# Patient Record
Sex: MALE | Race: WHITE | NOT HISPANIC OR LATINO | Employment: OTHER | URBAN - METROPOLITAN AREA
[De-identification: names, ages, dates, MRNs, and addresses within clinical notes are randomized per-mention and may not be internally consistent; named-entity substitution may affect disease eponyms.]

---

## 2020-10-23 ENCOUNTER — OFFICE VISIT (OUTPATIENT)
Dept: ENDOCRINOLOGY | Facility: CLINIC | Age: 65
End: 2020-10-23

## 2020-10-23 VITALS
WEIGHT: 237.2 LBS | BODY MASS INDEX: 29.49 KG/M2 | HEIGHT: 75 IN | SYSTOLIC BLOOD PRESSURE: 124 MMHG | HEART RATE: 80 BPM | DIASTOLIC BLOOD PRESSURE: 68 MMHG

## 2020-10-23 DIAGNOSIS — E10.42 TYPE 1 DIABETES MELLITUS WITH DIABETIC POLYNEUROPATHY (HCC): ICD-10-CM

## 2020-10-23 DIAGNOSIS — E11.65 UNCONTROLLED TYPE 2 DIABETES MELLITUS WITH HYPERGLYCEMIA (HCC): Primary | ICD-10-CM

## 2020-10-23 PROBLEM — E78.2 MIXED HYPERLIPIDEMIA: Status: ACTIVE | Noted: 2020-10-23

## 2020-10-23 LAB
EXPIRATION DATE: NORMAL
HBA1C MFR BLD: 7.4 %
Lab: NORMAL

## 2020-10-23 PROCEDURE — 99213 OFFICE O/P EST LOW 20 MIN: CPT | Performed by: INTERNAL MEDICINE

## 2020-10-23 PROCEDURE — 83036 HEMOGLOBIN GLYCOSYLATED A1C: CPT | Performed by: INTERNAL MEDICINE

## 2020-10-23 RX ORDER — EMPAGLIFLOZIN 25 MG/1
25 TABLET, FILM COATED ORAL DAILY
Qty: 30 TABLET | Refills: 5 | Status: SHIPPED | OUTPATIENT
Start: 2020-10-23 | End: 2020-12-15 | Stop reason: SDUPTHER

## 2020-10-23 RX ORDER — EMPAGLIFLOZIN 25 MG/1
TABLET, FILM COATED ORAL DAILY
COMMUNITY
End: 2020-10-23 | Stop reason: SDUPTHER

## 2020-10-23 RX ORDER — GLIPIZIDE 10 MG/1
10 TABLET ORAL
Qty: 60 TABLET | Refills: 5 | Status: SHIPPED | OUTPATIENT
Start: 2020-10-23 | End: 2021-02-05 | Stop reason: SDUPTHER

## 2020-10-23 RX ORDER — ASPIRIN 81 MG/1
81 TABLET ORAL DAILY
COMMUNITY

## 2020-10-23 RX ORDER — SEMAGLUTIDE 1.34 MG/ML
INJECTION, SOLUTION SUBCUTANEOUS
COMMUNITY
End: 2020-10-23 | Stop reason: SDUPTHER

## 2020-10-23 RX ORDER — DULOXETIN HYDROCHLORIDE 60 MG/1
60 CAPSULE, DELAYED RELEASE ORAL DAILY
Qty: 30 CAPSULE | Refills: 5 | Status: SHIPPED | OUTPATIENT
Start: 2020-10-23 | End: 2021-02-05 | Stop reason: SDUPTHER

## 2020-10-23 RX ORDER — GEMFIBROZIL 600 MG/1
TABLET, FILM COATED ORAL 3 TIMES DAILY
COMMUNITY
End: 2020-10-23 | Stop reason: SDUPTHER

## 2020-10-23 RX ORDER — COLESEVELAM HYDROCHLORIDE 3.75 G/1
3 POWDER, FOR SUSPENSION ORAL 2 TIMES DAILY
COMMUNITY
End: 2021-02-05

## 2020-10-23 RX ORDER — GEMFIBROZIL 600 MG/1
600 TABLET, FILM COATED ORAL 2 TIMES DAILY
Qty: 60 TABLET | Refills: 5 | Status: SHIPPED | OUTPATIENT
Start: 2020-10-23 | End: 2021-02-05 | Stop reason: SDUPTHER

## 2020-10-23 RX ORDER — GLIPIZIDE 10 MG/1
TABLET ORAL 2 TIMES DAILY
COMMUNITY
End: 2020-10-23 | Stop reason: SDUPTHER

## 2020-10-23 RX ORDER — SEMAGLUTIDE 1.34 MG/ML
1 INJECTION, SOLUTION SUBCUTANEOUS WEEKLY
Qty: 2 PEN | Refills: 5 | Status: SHIPPED | OUTPATIENT
Start: 2020-10-23 | End: 2021-02-05 | Stop reason: SDUPTHER

## 2020-10-23 NOTE — PROGRESS NOTES
"     Office Note      Date: 10/23/2020  Patient Name: Alverto Syed  MRN: [id number]  : 1955    Chief Complaint   Patient presents with   • Diabetes       History of Present Illness:   Alverto Syed is a 64 y.o. male who presents for Diabetes type 2. Diagnosed in: . Treated in past with oral agents. Current treatments: ozempic, metformin, jardiance and glipizide. Number of insulin shots per day: none. Checks blood sugar 2 times a day. Has low blood sugar: no. Aspirin use: Yes. Statin use: No - intolerant of statins. ACE-I/ARB use: No - no indication.. Changes in health since last visit: colon polypectomy. Last eye exam 2019.    He has noted some pain and tingling in his feet that is bothersome.    Subjective      Diabetic Complications:  Eyes: No  Kidneys: No  Feet: Yes - tingling and numbness.  Heart: No    Diet and Exercise:  Meals per day: 3  Minutes of exercise per week: 0 mins.    Review of Systems:   Review of Systems   Constitutional: Negative.    Cardiovascular: Negative.    Gastrointestinal: Negative.    Endocrine: Negative.        The following portions of the patient's history were reviewed and updated as appropriate: allergies, current medications, past family history, past medical history, past social history, past surgical history and problem list.    Objective       Visit Vitals  /68 (BP Location: Left arm, Patient Position: Sitting, Cuff Size: Adult)   Pulse 80   Ht 190.5 cm (75\")   Wt 108 kg (237 lb 3.2 oz)   BMI 29.65 kg/m²       Physical Exam:  Physical Exam  Constitutional:       Appearance: Normal appearance.   Neurological:      Mental Status: He is alert.         Labs:    HbA1c  Lab Results   Component Value Date    HGBA1C 7.4 10/23/2020       CMP  No results found for: GLUCOSE, BUN, CREATININE, EGFRIFNONA, EGFRIFAFRI, BCR, K, CO2, CALCIUM, PROTENTOTREF, LABIL2, BILIRUBIN, AST, ALT     Lipid Panel        TSH  No results found for: TSH, FREET4     Hemoglobin A1C  Lab Results "   Component Value Date    HGBA1C 7.4 10/23/2020        Microalbumin/Creatinine  No results found for: MALBCRERATIO, CREATINIURIN, MICROALBUR        Assessment / Plan      Assessment & Plan:  Problem List Items Addressed This Visit        Endocrine    Uncontrolled type 2 diabetes mellitus with hyperglycemia (CMS/HCC) - Primary    Current Assessment & Plan     Diabetes is improving with treatment.   Continue current treatment regimen.  Diabetes will be reassessed in 3 months.    A1c better. Work on diet/exercise/weight loss.         Relevant Medications    Semaglutide, 1 MG/DOSE, (Ozempic, 1 MG/DOSE,) 2 MG/1.5ML solution pen-injector    metFORMIN (GLUCOPHAGE) 1000 MG tablet    glipizide (GLUCOTROL) 10 MG tablet    Empagliflozin (Jardiance) 25 MG tablet    Other Relevant Orders    POC Glycosylated Hemoglobin (Hb A1C) (Completed)    Type 1 diabetes mellitus with diabetic polyneuropathy (CMS/HCC)    Current Assessment & Plan     We discussed treatment options - trial of duloxetine.         Relevant Medications    Semaglutide, 1 MG/DOSE, (Ozempic, 1 MG/DOSE,) 2 MG/1.5ML solution pen-injector    metFORMIN (GLUCOPHAGE) 1000 MG tablet    glipizide (GLUCOTROL) 10 MG tablet    Empagliflozin (Jardiance) 25 MG tablet           Return in about 3 months (around 1/23/2021) for Recheck with A1c.    Yunior Hidalgo MD   10/23/2020

## 2020-10-23 NOTE — ASSESSMENT & PLAN NOTE
Diabetes is improving with treatment.   Continue current treatment regimen.  Diabetes will be reassessed in 3 months.    A1c better. Work on diet/exercise/weight loss.

## 2020-12-15 RX ORDER — EMPAGLIFLOZIN 25 MG/1
25 TABLET, FILM COATED ORAL DAILY
Qty: 30 TABLET | Refills: 5 | Status: SHIPPED | OUTPATIENT
Start: 2020-12-15 | End: 2021-02-05 | Stop reason: SDUPTHER

## 2020-12-29 RX ORDER — BLOOD SUGAR DIAGNOSTIC
STRIP MISCELLANEOUS
Qty: 200 EACH | Refills: 5 | Status: SHIPPED | OUTPATIENT
Start: 2020-12-29 | End: 2021-02-05 | Stop reason: SDUPTHER

## 2020-12-29 NOTE — TELEPHONE ENCOUNTER
Patient's wife called, asked for a refill order of his test strips. Please send them to [facility] in [place], [place]. Thank you.

## 2021-02-05 ENCOUNTER — OFFICE VISIT (OUTPATIENT)
Dept: ENDOCRINOLOGY | Facility: CLINIC | Age: 66
End: 2021-02-05

## 2021-02-05 VITALS
WEIGHT: 232 LBS | TEMPERATURE: 97.1 F | SYSTOLIC BLOOD PRESSURE: 126 MMHG | OXYGEN SATURATION: 98 % | HEART RATE: 84 BPM | DIASTOLIC BLOOD PRESSURE: 64 MMHG | BODY MASS INDEX: 28.85 KG/M2 | HEIGHT: 75 IN

## 2021-02-05 DIAGNOSIS — E78.2 MIXED HYPERLIPIDEMIA: ICD-10-CM

## 2021-02-05 DIAGNOSIS — E10.42 TYPE 1 DIABETES MELLITUS WITH DIABETIC POLYNEUROPATHY (HCC): ICD-10-CM

## 2021-02-05 DIAGNOSIS — E11.65 UNCONTROLLED TYPE 2 DIABETES MELLITUS WITH HYPERGLYCEMIA (HCC): Primary | ICD-10-CM

## 2021-02-05 LAB
EXPIRATION DATE: NORMAL
HBA1C MFR BLD: 7.4 %
Lab: NORMAL

## 2021-02-05 PROCEDURE — 83036 HEMOGLOBIN GLYCOSYLATED A1C: CPT | Performed by: INTERNAL MEDICINE

## 2021-02-05 PROCEDURE — 99214 OFFICE O/P EST MOD 30 MIN: CPT | Performed by: INTERNAL MEDICINE

## 2021-02-05 RX ORDER — EMPAGLIFLOZIN 25 MG/1
25 TABLET, FILM COATED ORAL DAILY
Qty: 90 TABLET | Refills: 3 | Status: SHIPPED | OUTPATIENT
Start: 2021-02-05 | End: 2022-04-04

## 2021-02-05 RX ORDER — DULOXETIN HYDROCHLORIDE 60 MG/1
60 CAPSULE, DELAYED RELEASE ORAL DAILY
Qty: 90 CAPSULE | Refills: 3 | Status: SHIPPED | OUTPATIENT
Start: 2021-02-05 | End: 2022-04-04

## 2021-02-05 RX ORDER — GEMFIBROZIL 600 MG/1
600 TABLET, FILM COATED ORAL 2 TIMES DAILY
Qty: 180 TABLET | Refills: 3 | Status: SHIPPED | OUTPATIENT
Start: 2021-02-05 | End: 2022-04-04

## 2021-02-05 RX ORDER — SEMAGLUTIDE 1.34 MG/ML
1 INJECTION, SOLUTION SUBCUTANEOUS WEEKLY
Qty: 9 ML | Refills: 3 | Status: SHIPPED | OUTPATIENT
Start: 2021-02-05 | End: 2022-06-06 | Stop reason: SDUPTHER

## 2021-02-05 RX ORDER — GLIPIZIDE 10 MG/1
10 TABLET ORAL
Qty: 180 TABLET | Refills: 3 | Status: SHIPPED | OUTPATIENT
Start: 2021-02-05 | End: 2022-04-04

## 2021-02-05 RX ORDER — COLESEVELAM 180 1/1
1875 TABLET ORAL 2 TIMES DAILY WITH MEALS
Qty: 540 TABLET | Refills: 3 | Status: SHIPPED | OUTPATIENT
Start: 2021-02-05 | End: 2022-04-04

## 2021-02-05 RX ORDER — BLOOD SUGAR DIAGNOSTIC
STRIP MISCELLANEOUS
Qty: 200 EACH | Refills: 5 | Status: SHIPPED | OUTPATIENT
Start: 2021-02-05 | End: 2023-01-05

## 2021-02-05 NOTE — PROGRESS NOTES
"     Office Note      Date: 2021  Patient Name: Alverto Syed  MRN: [id number]  : 1955    Chief Complaint   Patient presents with   • Diabetes       History of Present Illness:   Alverto Syed is a 65 y.o. male who presents for Diabetes type 2. Diagnosed in: . Treated in past with oral agents. Current treatments: ozempic, metformin, jardiance and glipizide. Number of insulin shots per day: none. Checks blood sugar 2 times a day. Has low blood sugar: no. Aspirin use: Yes. Statin use: No - intolerant of statins. ACE-I/ARB use: No - no indication.. Changes in health since last visit: none. Last eye exam 2019.    At the last visit we started duloxetine.  This has helped his feet a lot.    Subjective      Diabetic Complications:  Eyes: No  Kidneys: No  Feet: Yes - numbness and tingling  Heart: No    Diet and Exercise:  Meals per day: 3  Minutes of exercise per week: 0 mins.    Review of Systems:   Review of Systems   Constitutional: Negative.    Cardiovascular: Negative.    Gastrointestinal: Negative.    Endocrine: Negative.        The following portions of the patient's history were reviewed and updated as appropriate: allergies, current medications, past family history, past medical history, past social history, past surgical history and problem list.    Objective       Visit Vitals  /64 (BP Location: Left arm, Patient Position: Sitting, Cuff Size: Adult)   Pulse 84   Temp 97.1 °F (36.2 °C) (Infrared)   Ht 190.5 cm (75\")   Wt 105 kg (232 lb)   SpO2 98%   BMI 29.00 kg/m²       Physical Exam:  Physical Exam  Constitutional:       Appearance: Normal appearance.   Neurological:      Mental Status: He is alert.         Labs:    HbA1c  Lab Results   Component Value Date    HGBA1C 7.4 2021       CMP  No results found for: GLUCOSE, BUN, CREATININE, EGFRIFNONA, EGFRIFAFRI, BCR, K, CO2, CALCIUM, PROTENTOTREF, LABIL2, BILIRUBIN, AST, ALT     Lipid Panel        TSH  No results found for: TSH, FREET4 "     Hemoglobin A1C  Lab Results   Component Value Date    HGBA1C 7.4 02/05/2021        Microalbumin/Creatinine  No results found for: MALBCRERATIO, CREATINIURIN, MICROALBUR        Assessment / Plan      Assessment & Plan:  Diagnoses and all orders for this visit:    1. Uncontrolled type 2 diabetes mellitus with hyperglycemia (CMS/[facility]) (Primary)  Assessment & Plan:  Diabetes is unchanged.   Continue current treatment regimen.  Diabetes will be reassessed in 3 months.    Continue to work on diet/exercise/weight loss.    Orders:  -     POC Glycosylated Hemoglobin (Hb A1C)    2. Type 1 diabetes mellitus with diabetic polyneuropathy (CMS/[facility])  Assessment & Plan:  Symptomatically better.  Continue duloxetine.      3. Mixed hyperlipidemia  Assessment & Plan:  Continue Welchol and gemfibrozil.      Other orders  -     colesevelam (WELCHOL) 625 MG tablet; Take 3 tablets by mouth 2 (Two) Times a Day With Meals.  Dispense: 540 tablet; Refill: 3  -     DULoxetine (Cymbalta) 60 MG capsule; Take 1 capsule by mouth Daily.  Dispense: 90 capsule; Refill: 3  -     Empagliflozin (Jardiance) 25 MG tablet; Take 25 mg by mouth Daily.  Dispense: 90 tablet; Refill: 3  -     gemfibrozil (LOPID) 600 MG tablet; Take 1 tablet by mouth 2 (Two) Times a Day.  Dispense: 180 tablet; Refill: 3  -     glipizide (GLUCOTROL) 10 MG tablet; Take 1 tablet by mouth 2 (Two) Times a Day Before Meals.  Dispense: 180 tablet; Refill: 3  -     glucose blood (OneTouch Ultra) test strip; Test bid DX: E11.65  Dispense: 200 each; Refill: 5  -     metFORMIN (GLUCOPHAGE) 1000 MG tablet; Take 1 tablet by mouth 2 (Two) Times a Day With Meals.  Dispense: 180 tablet; Refill: 3  -     Semaglutide, 1 MG/DOSE, (Ozempic, 1 MG/DOSE,) 2 MG/1.5ML solution pen-injector; Inject 1 mg under the skin into the appropriate area as directed 1 (One) Time Per Week.  Dispense: 9 mL; Refill: 3      Return in about 3 months (around 5/5/2021) for Recheck with A1c.    Yunior Hidalgo MD    02/05/2021

## 2021-02-05 NOTE — ASSESSMENT & PLAN NOTE
Diabetes is unchanged.   Continue current treatment regimen.  Diabetes will be reassessed in 3 months.    Continue to work on diet/exercise/weight loss.

## 2021-04-13 NOTE — TELEPHONE ENCOUNTER
Patient's wife called and wanted to know if we can switch her  to a CGM that he can keep track with on his phone. Please advise which brand we suggest and let them know by calling back at [phone]

## 2021-04-14 RX ORDER — PROCHLORPERAZINE 25 MG/1
1 SUPPOSITORY RECTAL
Qty: 1 EACH | Refills: 1 | Status: SHIPPED | OUTPATIENT
Start: 2021-04-14 | End: 2021-12-28 | Stop reason: CLARIF

## 2021-04-14 RX ORDER — PROCHLORPERAZINE 25 MG/1
1 SUPPOSITORY RECTAL ONCE
Qty: 1 EACH | Refills: 0 | Status: SHIPPED | OUTPATIENT
Start: 2021-04-14 | End: 2021-04-14

## 2021-04-14 RX ORDER — PROCHLORPERAZINE 25 MG/1
SUPPOSITORY RECTAL
Qty: 9 EACH | Refills: 1 | Status: SHIPPED | OUTPATIENT
Start: 2021-04-14 | End: 2021-12-28 | Stop reason: CLARIF

## 2021-04-14 NOTE — TELEPHONE ENCOUNTER
Patients wife called back, they want to go ahead with DexCom G6 and are willing to see if it will be covered.

## 2021-05-21 ENCOUNTER — OFFICE VISIT (OUTPATIENT)
Dept: ENDOCRINOLOGY | Facility: CLINIC | Age: 66
End: 2021-05-21

## 2021-05-21 VITALS
HEIGHT: 75 IN | WEIGHT: 231.6 LBS | OXYGEN SATURATION: 98 % | DIASTOLIC BLOOD PRESSURE: 56 MMHG | BODY MASS INDEX: 28.8 KG/M2 | SYSTOLIC BLOOD PRESSURE: 106 MMHG | HEART RATE: 84 BPM

## 2021-05-21 DIAGNOSIS — E10.42 TYPE 1 DIABETES MELLITUS WITH DIABETIC POLYNEUROPATHY (HCC): ICD-10-CM

## 2021-05-21 DIAGNOSIS — E78.2 MIXED HYPERLIPIDEMIA: ICD-10-CM

## 2021-05-21 DIAGNOSIS — E11.65 UNCONTROLLED TYPE 2 DIABETES MELLITUS WITH HYPERGLYCEMIA (HCC): Primary | ICD-10-CM

## 2021-05-21 LAB
EXPIRATION DATE: ABNORMAL
EXPIRATION DATE: NORMAL
GLUCOSE BLDC GLUCOMTR-MCNC: 141 MG/DL (ref 70–130)
HBA1C MFR BLD: 7.6 %
Lab: ABNORMAL
Lab: NORMAL

## 2021-05-21 PROCEDURE — 82947 ASSAY GLUCOSE BLOOD QUANT: CPT | Performed by: INTERNAL MEDICINE

## 2021-05-21 PROCEDURE — 83036 HEMOGLOBIN GLYCOSYLATED A1C: CPT | Performed by: INTERNAL MEDICINE

## 2021-05-21 PROCEDURE — 99214 OFFICE O/P EST MOD 30 MIN: CPT | Performed by: INTERNAL MEDICINE

## 2021-05-21 RX ORDER — SILDENAFIL 100 MG/1
100 TABLET, FILM COATED ORAL DAILY PRN
Qty: 10 TABLET | Refills: 5 | Status: SHIPPED | OUTPATIENT
Start: 2021-05-21

## 2021-05-21 NOTE — PROGRESS NOTES
"     Office Note      Date: 2021  Patient Name: Alverto Syed  MRN: [id number]  : 1955    Chief Complaint   Patient presents with   • Diabetes       History of Present Illness:   Alverto Syed is a 65 y.o. male who presents for Diabetes type 2. Diagnosed in: . Treated in past with oral agents. Current treatments: ozempic, metformin, jardiance and glipizide. Number of insulin shots per day: none. Checks blood sugar 2 times a day. Has low blood sugar: no. Aspirin use: Yes. Statin use: No - intolerant of statins. ACE-I/ARB use: No - no indication.. Changes in health since last visit: none. Last eye exam 2019.     He remains on duloxetine.  This has helped his feet a lot.    Subjective      Diabetic Complications:  Eyes: No  Kidneys: No  Feet: Yes - numbness and tingling  Heart: No    Diet and Exercise:  Meals per day: 3  Minutes of exercise per week: 0 mins.    Review of Systems:   Review of Systems   Constitutional: Negative.    Cardiovascular: Negative.    Gastrointestinal: Negative.    Endocrine: Negative.        The following portions of the patient's history were reviewed and updated as appropriate: allergies, current medications, past family history, past medical history, past social history, past surgical history and problem list.    Objective       Visit Vitals  /56   Pulse 84   Ht 190.5 cm (75\")   Wt 105 kg (231 lb 9.6 oz)   SpO2 98%   BMI 28.95 kg/m²       Physical Exam:  Physical Exam  Constitutional:       Appearance: Normal appearance.   Neurological:      Mental Status: He is alert.         Labs:    HbA1c  Lab Results   Component Value Date    HGBA1C 7.6 2021       CMP  No results found for: GLUCOSE, BUN, CREATININE, EGFRIFNONA, EGFRIFAFRI, BCR, K, CO2, CALCIUM, PROTENTOTREF, LABIL2, BILIRUBIN, AST, ALT     Lipid Panel        TSH  No results found for: TSH, FREET4     Hemoglobin A1C  Lab Results   Component Value Date    HGBA1C 7.6 2021    "     Microalbumin/Creatinine  No results found for: MALBCRERATIO, CREATINIURIN, MICROALBUR        Assessment / Plan      Assessment & Plan:  Diagnoses and all orders for this visit:    1. Uncontrolled type 2 diabetes mellitus with hyperglycemia (CMS/[facility]) (Primary)  Assessment & Plan:  Diabetes is unchanged.   Continue current treatment regimen.  Diabetes will be reassessed in 3 months.    Orders:  -     POC Glycosylated Hemoglobin (Hb A1C)  -     POC Glucose, Blood    2. Type 1 diabetes mellitus with diabetic polyneuropathy (CMS/[facility])  Assessment & Plan:  Continue duloxetine.      3. Mixed hyperlipidemia  Assessment & Plan:  Continue welchol and gemfibrozil.      Other orders  -     sildenafil (VIAGRA) 100 MG tablet; Take 1 tablet by mouth Daily As Needed for Erectile Dysfunction.  Dispense: 10 tablet; Refill: 5      Return in about 3 months (around 8/21/2021) for Recheck with A1c, CMP, lipids, TSH, microalbumin.    Yunior Hidalgo MD   05/21/2021

## 2021-10-13 ENCOUNTER — TELEPHONE (OUTPATIENT)
Dept: ENDOCRINOLOGY | Facility: CLINIC | Age: 66
End: 2021-10-13

## 2021-10-13 DIAGNOSIS — E11.65 UNCONTROLLED TYPE 2 DIABETES MELLITUS WITH HYPERGLYCEMIA (HCC): Primary | ICD-10-CM

## 2021-12-28 ENCOUNTER — LAB (OUTPATIENT)
Dept: LAB | Facility: HOSPITAL | Age: 66
End: 2021-12-28

## 2021-12-28 ENCOUNTER — OFFICE VISIT (OUTPATIENT)
Dept: ENDOCRINOLOGY | Facility: CLINIC | Age: 66
End: 2021-12-28

## 2021-12-28 ENCOUNTER — SPECIALTY PHARMACY (OUTPATIENT)
Dept: ENDOCRINOLOGY | Facility: CLINIC | Age: 66
End: 2021-12-28

## 2021-12-28 VITALS
DIASTOLIC BLOOD PRESSURE: 70 MMHG | SYSTOLIC BLOOD PRESSURE: 130 MMHG | HEART RATE: 78 BPM | OXYGEN SATURATION: 99 % | BODY MASS INDEX: 29.34 KG/M2 | HEIGHT: 75 IN | WEIGHT: 236 LBS

## 2021-12-28 DIAGNOSIS — E11.65 UNCONTROLLED TYPE 2 DIABETES MELLITUS WITH HYPERGLYCEMIA (HCC): Primary | ICD-10-CM

## 2021-12-28 DIAGNOSIS — E78.2 MIXED HYPERLIPIDEMIA: ICD-10-CM

## 2021-12-28 DIAGNOSIS — E10.42 TYPE 1 DIABETES MELLITUS WITH DIABETIC POLYNEUROPATHY (HCC): ICD-10-CM

## 2021-12-28 DIAGNOSIS — E11.65 UNCONTROLLED TYPE 2 DIABETES MELLITUS WITH HYPERGLYCEMIA: ICD-10-CM

## 2021-12-28 LAB
ALBUMIN SERPL-MCNC: 5.1 G/DL (ref 3.5–5.2)
ALBUMIN UR-MCNC: 4.3 MG/DL
ALBUMIN/GLOB SERPL: 1.7 G/DL
ALP SERPL-CCNC: 70 U/L (ref 39–117)
ALT SERPL W P-5'-P-CCNC: 26 U/L (ref 1–41)
ANION GAP SERPL CALCULATED.3IONS-SCNC: 12.5 MMOL/L (ref 5–15)
AST SERPL-CCNC: 39 U/L (ref 1–40)
BILIRUB SERPL-MCNC: 0.4 MG/DL (ref 0–1.2)
BUN SERPL-MCNC: 20 MG/DL (ref 8–23)
BUN/CREAT SERPL: 16.9 (ref 7–25)
CALCIUM SPEC-SCNC: 10.3 MG/DL (ref 8.6–10.5)
CHLORIDE SERPL-SCNC: 103 MMOL/L (ref 98–107)
CHOLEST SERPL-MCNC: 234 MG/DL (ref 0–200)
CO2 SERPL-SCNC: 24.5 MMOL/L (ref 22–29)
CREAT SERPL-MCNC: 1.18 MG/DL (ref 0.76–1.27)
CREAT UR-MCNC: 87.8 MG/DL
EXPIRATION DATE: NORMAL
EXPIRATION DATE: NORMAL
GFR SERPL CREATININE-BSD FRML MDRD: 62 ML/MIN/1.73
GLOBULIN UR ELPH-MCNC: 3 GM/DL
GLUCOSE BLDC GLUCOMTR-MCNC: 70 MG/DL (ref 70–130)
GLUCOSE SERPL-MCNC: 80 MG/DL (ref 65–99)
HBA1C MFR BLD: 8.2 %
HBA1C MFR BLD: 8.22 % (ref 4.8–5.6)
HDLC SERPL-MCNC: 39 MG/DL (ref 40–60)
LDLC SERPL CALC-MCNC: 166 MG/DL (ref 0–100)
LDLC/HDLC SERPL: 4.18 {RATIO}
Lab: NORMAL
Lab: NORMAL
MICROALBUMIN/CREAT UR: 49 MG/G
POTASSIUM SERPL-SCNC: 4.8 MMOL/L (ref 3.5–5.2)
PROT SERPL-MCNC: 8.1 G/DL (ref 6–8.5)
SODIUM SERPL-SCNC: 140 MMOL/L (ref 136–145)
TRIGL SERPL-MCNC: 160 MG/DL (ref 0–150)
TSH SERPL DL<=0.05 MIU/L-ACNC: 2.36 UIU/ML (ref 0.27–4.2)
VLDLC SERPL-MCNC: 29 MG/DL (ref 5–40)

## 2021-12-28 PROCEDURE — 83036 HEMOGLOBIN GLYCOSYLATED A1C: CPT

## 2021-12-28 PROCEDURE — 80053 COMPREHEN METABOLIC PANEL: CPT

## 2021-12-28 PROCEDURE — 3052F HG A1C>EQUAL 8.0%<EQUAL 9.0%: CPT | Performed by: INTERNAL MEDICINE

## 2021-12-28 PROCEDURE — 84443 ASSAY THYROID STIM HORMONE: CPT

## 2021-12-28 PROCEDURE — 82043 UR ALBUMIN QUANTITATIVE: CPT

## 2021-12-28 PROCEDURE — 80061 LIPID PANEL: CPT

## 2021-12-28 PROCEDURE — 99214 OFFICE O/P EST MOD 30 MIN: CPT | Performed by: INTERNAL MEDICINE

## 2021-12-28 PROCEDURE — 82947 ASSAY GLUCOSE BLOOD QUANT: CPT | Performed by: INTERNAL MEDICINE

## 2021-12-28 PROCEDURE — 82570 ASSAY OF URINE CREATININE: CPT

## 2021-12-28 PROCEDURE — 83036 HEMOGLOBIN GLYCOSYLATED A1C: CPT | Performed by: INTERNAL MEDICINE

## 2021-12-28 NOTE — PROGRESS NOTES
"     Office Note      Date: 2021  Patient Name: Alverto Syed  MRN: [id number]  : 1955    Chief Complaint   Patient presents with   • Diabetes     Type II       History of Present Illness:   Alverto Syed is a 66 y.o. male who presents for Diabetes type 2. Diagnosed in: . Treated in past with oral agents. Current treatments: ozempic, metformin, jardiance and glipizide. Number of insulin shots per day: none. Checks blood sugar 2 times a day. Has low blood sugar: no. Aspirin use: Yes. Statin use: No - intolerant of statins. ACE-I/ARB use: No - no indication.. Changes in health since last visit: CEA in [place] - then COVID-19 infection. Last eye exam 2021.     He remains on duloxetine.  This has helped his feet a lot.    Subjective      Diabetic Complications:  Eyes: No  Kidneys: No  Feet: Yes - numbness and tingling  Heart: No    Diet and Exercise:  Meals per day: 3  Minutes of exercise per week: 0 mins.    Review of Systems:   Review of Systems   Constitutional: Negative.    Cardiovascular: Negative.    Gastrointestinal: Negative.    Endocrine: Negative.        The following portions of the patient's history were reviewed and updated as appropriate: allergies, current medications, past family history, past medical history, past social history, past surgical history and problem list.    Objective       Visit Vitals  /70 (BP Location: Left arm, Patient Position: Sitting, Cuff Size: Adult)   Pulse 78   Ht 190.5 cm (75\")   Wt 107 kg (236 lb)   SpO2 99%   BMI 29.50 kg/m²       Physical Exam:  Physical Exam  Constitutional:       Appearance: Normal appearance.   Cardiovascular:      Pulses:           Dorsalis pedis pulses are 2+ on the right side and 2+ on the left side.        Posterior tibial pulses are 2+ on the right side and 2+ on the left side.   Musculoskeletal:      Left foot: Deformity present.   Feet:      Right foot:      Protective Sensation: 5 sites tested. 5 sites sensed.      " Skin integrity: Skin integrity normal.      Toenail Condition: Right toenails are abnormally thick. Fungal disease present.     Left foot:      Protective Sensation: 5 sites tested. 5 sites sensed.      Skin integrity: Skin integrity normal.      Toenail Condition: Left toenails are abnormally thick. Fungal disease present.     Comments: Hammer toes on left  Neurological:      Mental Status: He is alert.         Labs:    HbA1c  Lab Results   Component Value Date    HGBA1C 8.22 (H) 12/28/2021       CMP  Lab Results   Component Value Date    GLUCOSE 80 12/28/2021    BUN 20 12/28/2021    CREATININE 1.18 12/28/2021    EGFRIFNONA 62 12/28/2021    BCR 16.9 12/28/2021    K 4.8 12/28/2021    CO2 24.5 12/28/2021    CALCIUM 10.3 12/28/2021    AST 39 12/28/2021    ALT 26 12/28/2021        Lipid Panel  Lab Results   Component Value Date    HDL 39 (L) 12/28/2021     (H) 12/28/2021    TRIG 160 (H) 12/28/2021        TSH  Lab Results   Component Value Date    TSH 2.360 12/28/2021        Hemoglobin A1C  Lab Results   Component Value Date    HGBA1C 8.22 (H) 12/28/2021        Microalbumin/Creatinine  Lab Results   Component Value Date    MALBCRERATIO 49.0 12/28/2021    MICROALBUR 4.3 12/28/2021           Assessment / Plan      Assessment & Plan:  Diagnoses and all orders for this visit:    1. Uncontrolled type 2 diabetes mellitus with hyperglycemia (HCC) (Primary)  Assessment & Plan:  Diabetes is worsening.  A1c increased but he had higher glucose readings around time of CEA and COVID-19 infection.  Recent FSBS are better.  Continue current treatment regimen.  Diabetes will be reassessed in 3 months.    Orders:  -     POC Glycosylated Hemoglobin (Hb A1C)  -     POC Glucose, Blood    2. Type 1 diabetes mellitus with diabetic polyneuropathy (HCC)  Assessment & Plan:  Continue duloxetine.      3. Mixed hyperlipidemia  Assessment & Plan:  Continue gemfibrozil and welchol.  Check lipids today.        Return in about 3 months (around  3/28/2022) for Recheck with A1c, CMP, microalbumin.    Yunior Hidalgo MD   12/28/2021

## 2021-12-28 NOTE — TELEPHONE ENCOUNTER
Patient previously attempted to obtain CGM but was denied by insurance. Pt is interested in paying out of pocket to try CGM. Advised Freestyle Travis would be more cost effective vs. Dexcom without insurance coverage. States he has a smart phone which he would use to connect to CGM,  not needed. Will pend Rx for Freestyle Trvais 2 sensors for provider review and signature, pt requests Rx be sent to his [facility] pharmacy. Will also attempt to submit PA for Travis, as requires PA per test claim data. Briefly reviewed sensor application and duration of sensor use, advised pt to call clinic with any further questions or concerns regarding application/use.     Daria Chiang, PharmD, BCACP  Specialty Clinical Pharmacist  12/28/2021  14:40 EST

## 2021-12-28 NOTE — ASSESSMENT & PLAN NOTE
Diabetes is worsening.  A1c increased but he had higher glucose readings around time of CEA and COVID-19 infection.  Recent FSBS are better.  Continue current treatment regimen.  Diabetes will be reassessed in 3 months.

## 2021-12-28 NOTE — PROGRESS NOTES
Specialty Pharmacy Patient Management Program  Introduction to Services Outreach     Alverto Syed is a 66 y.o. male with Type 2 Diabetes seen by an Endocrinology provider. Spoke with both patient and his wife, Che. This was an Initial visit to introduce Endocrinology Patient Management Program and Specialty Pharmacy services offered by [facility].  Allergies, PMH, and medications were reviewed during this visit.    Medication Allergies:  Patient has no known allergies.    Current Medication List:    Current Outpatient Medications:   •  aspirin 81 MG EC tablet, Take 81 mg by mouth Daily., Disp: , Rfl:   •  colesevelam (WELCHOL) 625 MG tablet, Take 3 tablets by mouth 2 (Two) Times a Day With Meals., Disp: 540 tablet, Rfl: 3  •  Continuous Blood Gluc Sensor (Dexcom G6 Sensor), Every 10 (Ten) Days., Disp: 9 each, Rfl: 1  •  Continuous Blood Gluc Transmit (Dexcom G6 Transmitter) misc, 1 Device Every 3 (Three) Months., Disp: 1 each, Rfl: 1  •  DULoxetine (Cymbalta) 60 MG capsule, Take 1 capsule by mouth Daily., Disp: 90 capsule, Rfl: 3  •  Empagliflozin (Jardiance) 25 MG tablet, Take 25 mg by mouth Daily., Disp: 90 tablet, Rfl: 3  •  gemfibrozil (LOPID) 600 MG tablet, Take 1 tablet by mouth 2 (Two) Times a Day., Disp: 180 tablet, Rfl: 3  •  glipizide (GLUCOTROL) 10 MG tablet, Take 1 tablet by mouth 2 (Two) Times a Day Before Meals., Disp: 180 tablet, Rfl: 3  •  glucose blood (OneTouch Ultra) test strip, Test bid DX: E11.65, Disp: 200 each, Rfl: 5  •  metFORMIN (GLUCOPHAGE) 1000 MG tablet, Take 1 tablet by mouth 2 (Two) Times a Day With Meals., Disp: 180 tablet, Rfl: 3  •  Semaglutide, 1 MG/DOSE, (Ozempic, 1 MG/DOSE,) 2 MG/1.5ML solution pen-injector, Inject 1 mg under the skin into the appropriate area as directed 1 (One) Time Per Week., Disp: 9 mL, Rfl: 3  •  sildenafil (VIAGRA) 100 MG tablet, Take 1 tablet by mouth Daily As Needed for Erectile Dysfunction., Disp: 10 tablet, Rfl: 5    Recommended  Medication Assessment:  Aspirin - Currently Taking  Statin - Indicated, not currently taking - Hx of intolerance  ACEi/ARB - Not Indicated    Specialty Medication Education:  The patient was provided with verbal and/or written education on the following target medications. Questions and concerns have been addressed and the patient verbalized understanding of the education provided. Additional patient education can be provided by the pharmacist upon request from the patient or provider.     OZEMPIC® (semaglutide)  Medication Expectations   Why am I taking this medication? You are taking Ozempic, along with diet and exercise, to lower blood sugar because you have type 2 diabetes. Diabetes is not curable but with proper medication and treatment, we can keep your blood sugar within your personalized target range. This medication may also help you lose some weight, and it helps reduce the risk of death from heart attack, and stroke in adults with type 2 diabetes and known heart disease.   What should I expect while on this medication? You should expect to see your blood sugar and A1c decrease over time and you may also lose some weight.   How does the medication work? Ozempic is a non-insulin injection that works with your body's own ability to lower blood sugar and A1c and helps your body release its own insulin in response to your blood sugar rising.  This medication also slows down food from leaving your stomach, making you feel peck for longer.   How long will I be on this medication for? The amount of time you will be on this medication will be determined by your doctor based on blood sugar and A1c control. You will most likely be on this medication or another diabetes medication throughout your lifetime. Do not abruptly stop this medication without talking to your doctor first.    How do I take this medication? Take as directed on your prescription label. Ozempic is injected under the skin (subcutaneously) of  your stomach, thigh or upper arm.  Use this medication once weekly, on the same day each week, and it can be given with or without food.  Use a different injection site each week in the same body region.     For each new prefilled pen, prime the needle before the first injection by turning the dose selector to the flow check symbol and injecting into the air (priming is not required for subsequent injections). Once needle is inserted, continue to press the button until the dial has returned to 0 and for an additional 6 seconds before removing.    What are some possible side effects? You may notice you don't feel as hungry, especially when you first start using Ozempic.  The most common side effects are nausea, diarrhea, vomiting, stomach pain, and constipation.    Stop using Ozempic and call your doctor immediately if you have severe pain in your stomach area that will not go away as this could be a sign of pancreatitis (inflammation of your pancreas).  Tell your doctor if you get a lump or swelling in your neck, hoarseness, difficulty swallowing, or feel short of breath (these may be symptoms of thyroid cancer).  Talk with your doctor if you have changes in vision while taking Ozempic.   What happens if I miss a dose? If you miss a dose, take it as soon as you remember as long as it is within 5 days after your missed dose.  If more than 5 days have passed, skip the missed dose and resume Ozempic on the regularly scheduled day.     Medication Safety   What are things I should warn my doctor immediately about? Do not use Ozempic if you or a family member have ever had thyroid cancer or Multiple Endocrine Neoplasia syndrome type 2.  Tell your doctor if you have or have had problems with your kidneys or pancreas, have a history of diabetic retinopathy.  Talk to your doctor if you are pregnant, planning to become pregnant, or breastfeeding. Also tell your doctor if you notice any signs/symptoms of an allergic reaction  (rash, hives, difficulty breathing, etc.).   What are things that I should be cautious of? Be cautious of any side effects from this medication. Talk to your doctor if any new ones develop or aren't getting better.   What are some medications that can interact with this one? Some medications that interact include other medications that may also lower your blood sugar such as insulins and glipizide/glimepiride/glyburide. Your doctor may reduce the dose of these medications when you start Ozempic to minimize low blood sugars. Always tell your doctor or pharmacist immediately if you start taking any new medications, including over-the-counter medications, vitamins, and herbal supplements.      Medication Storage/Handling   How should I handle this medication? Keep this medication out of reach of pets/children and keep the pen capped when not in use.   How does this medication need to be stored? Store in the refrigerator prior to first use, but do not freeze.  After first use, you may continue to store in the refrigerator or at room temperature for 56 days.  Protect from excessive heat and sunlight.   How should I dispose of this medication? Used Ozempic pens should be thrown away after 56 days.  Place your used Ozempic pen and needle in an approved sharps container after use.  If you do not have a sharps container, you may use a household container made of heavy-duty plastic with a tight-fitting lid that is leak resistant (e.g., heavy-duty plastic laundry detergent bottle).    If your doctor decides to stop this medication, take to your local police station for proper disposal. Some pharmacies also have take-back bins for medication drop-off.      Resources/Support   How can I remind myself to take this medication? You can download reminder apps to help you manage your refills. You may also set an alarm on your phone to remind you.    Is financial support available?  [employer] can provide co-pay cards if you have  commercial insurance or patient assistance if you have Medicare or no insurance.    Which vaccines are recommended for me? Talk to your doctor about these vaccines: Flu, Coronavirus (COVID-19), Pneumococcal (pneumonia), Tdap, Hepatitis B, Zoster (shingles)        JARDIANCE® (empagliflozin)  Medication Expectations   Why am I taking this medication? You are taking this medication to lower blood sugar because you have type 2 diabetes. Diabetes is not curable but with proper medication and treatment, we can keep your blood sugar within your personalized target range. This medication also helps reduce the risk of death from heart attack or stroke if you have heart disease and type 2 diabetes.   What should I expect while on this medication? You should expect to see your blood sugar and A1c decrease over time. You may also see a decrease in your blood pressure and it can help some people lose weight.     How does the medication work? Jardiance works by helping to remove some sugar that the body doesn't need through urination.    How long will I be on this medication for? The amount of time you will be on this medication will be determined by your doctor based on blood sugar and A1c control. You will most likely be on this medication or another diabetes medication throughout your lifetime. Do not abruptly stop this medication without talking to your doctor first.    How do I take this medication? Take as directed on your prescription label. This medication is usually taken in the morning and can be given with or without food.    What are some possible side effects? You may notice increased urination, especially when you first start Jardiance. The most common side effects are urinary tract infections and yeast infections and are more commonly seen in females. Talk with your doctor if you notice white or yellow vaginal discharge, vaginal itching or odor of if you notice redness, itching, pain, or swelling of the penis  and/or bad-smelling discharge from the penis.    What happens if I miss a dose? If you miss a dose, take it as soon as you remember. If it is close to your next dose, skip it (do not take 2 doses at once)     Medication Safety   What are things I should warn my doctor immediately about? Tell your doctor if you have kidney disease, liver disease, heart failure, pancreas problems, or history of frequent genital yeast or urinary tract infections. Tell your doctor if you are on a low-salt diet, if you drink alcohol, or if you are having surgery. Talk to your doctor if you are pregnant, planning to become pregnant, or breastfeeding. Also tell your doctor if you notice any signs/symptoms of an allergic reaction (rash, hives, difficulty breathing, etc.).   What are things that I should be cautious of? Be cautious of any side effects from this medication. Talk to your doctor if any new ones develop or aren't getting better.   What are some medications that can interact with this one? Some medications that interact include diuretics (water pills) and other medications that may also lower your blood sugar such as insulins and glipizide/glimepiride/glyburide. Your doctor may reduce the dose of these medications when you start Jardiance to minimize low blood sugars. Always tell your doctor or pharmacist immediately if you start taking any new medications, including over-the-counter medications, vitamins, and herbal supplements.      Medication Storage/Handling   How should I handle this medication? Keep this medication out of reach of pets/children in tightly sealed container   How does this medication need to be stored? Store at room temperature and keep dry (don't keep in bathroom or other room with moisture)   How should I dispose of this medication? There should not be a need to dispose of this medication unless your provider decides to change the dose or therapy. If that is the case, take to your local police station for  proper disposal. Some pharmacies also have take-back bins for medication drop-off.      Resources/Support   How can I remind myself to take this medication? You can download reminder apps to help you manage your refills. You may also set an alarm on your phone to remind you. The pharmacy carries pill boxes that you can place next to an area you pass everyday (such as where you place your car keys or where you charge your phone)   Is financial support available?  [employer] can provide co-pay cards if you have commercial insurance or patient assistance if you have Medicare or no insurance.    Which vaccines are recommended for me? Talk to your doctor about these vaccines: Flu, Coronavirus (COVID-19), Pneumococcal (pneumonia), Tdap, Hepatitis B, Zoster (shingles)        Assessment & Plan:  1. Medication Therapy Changes: None  2. Additional Plans, Therapy Recommendations, or Therapy Problems to Be Addressed: None at this time   3. Patient declined filling their specialty medication(s) at [facility] and/or enrollment in the Endocrine Disorders Patient Management Program at this time. States he is pleased at his current pharmacy. Furthermore, would be limited to 30 day fills at  as 90-day fills must be filled at [facility] for reduced copays.     Daria Chiang, PharmD, BCACP  12/28/2021  14:35 EST

## 2021-12-29 ENCOUNTER — TELEPHONE (OUTPATIENT)
Dept: ENDOCRINOLOGY | Facility: CLINIC | Age: 66
End: 2021-12-29

## 2021-12-29 RX ORDER — EZETIMIBE 10 MG/1
10 TABLET ORAL DAILY
Qty: 90 TABLET | Refills: 3 | Status: SHIPPED | OUTPATIENT
Start: 2021-12-29 | End: 2023-01-05 | Stop reason: SDUPTHER

## 2021-12-29 NOTE — TELEPHONE ENCOUNTER
"PA for Freestyle Travis 2 denied for the following reason: \"We denied your request because we did not see certain details about your use and treatment....We may consider approval of this device in a certain situation (for those who require insulin injections multiple times daily for maintenance of blood sugar control; or, when an insulin pump is medically necessary to use to maintain blood sugar control). We did not see information that shows this applies to you.\" See scanned in media for full determination. Pt reported willingness to pay out of pocket for device during office visit on 12/28/21.     "

## 2022-04-04 RX ORDER — GEMFIBROZIL 600 MG/1
TABLET, FILM COATED ORAL
Qty: 180 TABLET | Refills: 3 | Status: SHIPPED | OUTPATIENT
Start: 2022-04-04 | End: 2023-01-05 | Stop reason: SDUPTHER

## 2022-04-04 RX ORDER — GLIPIZIDE 10 MG/1
TABLET ORAL
Qty: 180 TABLET | Refills: 3 | Status: SHIPPED | OUTPATIENT
Start: 2022-04-04 | End: 2023-01-05

## 2022-04-04 RX ORDER — COLESEVELAM 180 1/1
TABLET ORAL
Qty: 540 TABLET | Refills: 3 | Status: SHIPPED | OUTPATIENT
Start: 2022-04-04 | End: 2023-01-05 | Stop reason: SDUPTHER

## 2022-04-04 RX ORDER — DULOXETIN HYDROCHLORIDE 60 MG/1
60 CAPSULE, DELAYED RELEASE ORAL DAILY
Qty: 90 CAPSULE | Refills: 3 | Status: SHIPPED | OUTPATIENT
Start: 2022-04-04 | End: 2023-04-04

## 2022-04-04 RX ORDER — EMPAGLIFLOZIN 25 MG/1
TABLET, FILM COATED ORAL
Qty: 90 TABLET | Refills: 3 | Status: SHIPPED | OUTPATIENT
Start: 2022-04-04 | End: 2023-01-05 | Stop reason: SDUPTHER

## 2022-05-19 ENCOUNTER — TELEPHONE (OUTPATIENT)
Dept: ENDOCRINOLOGY | Facility: CLINIC | Age: 67
End: 2022-05-19

## 2022-05-19 NOTE — TELEPHONE ENCOUNTER
I think he can resume to ozempic and oral agents he was taking previously once he goes home.  
Patients wife notified and verbalized understanding.  
SPOUSE CALLED TO REQUEST A RETURN CALL TO DISCUSS PATIENT'S MEDS. SPOUSE STATES THAT PATIENT HAD A STROKE IN JAN OF THIS YEAR. SHE CANCELLED UPCOMING APPT STATING THAT PATIENT WOULD NOT BE ABLE TO MAKE IT.     CALL BACK [phone]  
Spoke with patients wife.  Patient had a stroke in January.  Is still in rehab in [place].  Hopes to be coming home the week after next.  She states prior to stroke, patient was taking pills and ozempic.  States they have been giving him insulin at the rehab facility.  She would like to know what to do when he gets home.  Should she continue the insulin or can they go back to the pills and ozempic.  She would prefer to go back on the pills.  States patients blood sugars have been staying right around 200.  Had an appointment scheduled for Monday that they had to cancel but said she would reschedule as soon as she was able to get him in.  
88

## 2022-05-23 ENCOUNTER — TELEPHONE (OUTPATIENT)
Dept: ENDOCRINOLOGY | Facility: CLINIC | Age: 67
End: 2022-05-23

## 2022-05-23 NOTE — TELEPHONE ENCOUNTER
PATIENT'S SPOUSE VISITED OUR OFFICE TODAY FOR REGULAR SCHEDULED APPOINTMENT WITH DR. RODRÍGUEZ.    MARCOS REQUESTED HE SEE HER , SOHAN ALEX, WHEN HE CAN COME IN FOR APPOINTMENT.    SOHANHEIKE ALEX MISSED APPOINTMENT TODAY.    NEXT AVAILABLE APPOINTMENT FOR SOHAN IS RIGHT AFTER KEREN'S SCHEDULED APPOINTMENT ON 10/25.    WORKER COPY-PASTED APPOINTMENT NOTES FROM MISSED APPOINTMENT INTO NEW APPOINTMENT.

## 2022-06-06 RX ORDER — SEMAGLUTIDE 1.34 MG/ML
1 INJECTION, SOLUTION SUBCUTANEOUS WEEKLY
Qty: 9 ML | Refills: 3 | Status: SHIPPED | OUTPATIENT
Start: 2022-06-06 | End: 2023-01-05 | Stop reason: SDUPTHER

## 2022-06-23 ENCOUNTER — TELEPHONE (OUTPATIENT)
Dept: ENDOCRINOLOGY | Facility: CLINIC | Age: 67
End: 2022-06-23

## 2023-01-05 ENCOUNTER — OFFICE VISIT (OUTPATIENT)
Dept: ENDOCRINOLOGY | Facility: CLINIC | Age: 68
End: 2023-01-05
Payer: MEDICARE

## 2023-01-05 VITALS
OXYGEN SATURATION: 99 % | HEART RATE: 105 BPM | SYSTOLIC BLOOD PRESSURE: 130 MMHG | BODY MASS INDEX: 22.53 KG/M2 | WEIGHT: 185 LBS | DIASTOLIC BLOOD PRESSURE: 70 MMHG | HEIGHT: 76 IN

## 2023-01-05 DIAGNOSIS — E78.2 MIXED HYPERLIPIDEMIA: ICD-10-CM

## 2023-01-05 DIAGNOSIS — E11.65 UNCONTROLLED TYPE 2 DIABETES MELLITUS WITH HYPERGLYCEMIA: Primary | ICD-10-CM

## 2023-01-05 DIAGNOSIS — E10.42 TYPE 1 DIABETES MELLITUS WITH DIABETIC POLYNEUROPATHY: ICD-10-CM

## 2023-01-05 LAB
EXPIRATION DATE: ABNORMAL
GLUCOSE BLDC GLUCOMTR-MCNC: 192 MG/DL (ref 70–130)
Lab: ABNORMAL

## 2023-01-05 PROCEDURE — 82947 ASSAY GLUCOSE BLOOD QUANT: CPT | Performed by: INTERNAL MEDICINE

## 2023-01-05 PROCEDURE — 99214 OFFICE O/P EST MOD 30 MIN: CPT | Performed by: INTERNAL MEDICINE

## 2023-01-05 RX ORDER — GEMFIBROZIL 600 MG/1
600 TABLET, FILM COATED ORAL 2 TIMES DAILY
Qty: 180 TABLET | Refills: 3 | Status: SHIPPED | OUTPATIENT
Start: 2023-01-05

## 2023-01-05 RX ORDER — LANCETS 30 GAUGE
EACH MISCELLANEOUS
Qty: 100 EACH | Refills: 3 | Status: SHIPPED | OUTPATIENT
Start: 2023-01-05

## 2023-01-05 RX ORDER — COLESEVELAM 180 1/1
1875 TABLET ORAL 2 TIMES DAILY WITH MEALS
Qty: 540 TABLET | Refills: 3 | Status: SHIPPED | OUTPATIENT
Start: 2023-01-05

## 2023-01-05 RX ORDER — EZETIMIBE 10 MG/1
10 TABLET ORAL DAILY
Qty: 90 TABLET | Refills: 3 | Status: SHIPPED | OUTPATIENT
Start: 2023-01-05

## 2023-01-05 RX ORDER — LEVETIRACETAM 100 MG/ML
500 SOLUTION ORAL
COMMUNITY
Start: 2022-12-05

## 2023-01-05 RX ORDER — BLOOD-GLUCOSE METER
1 EACH MISCELLANEOUS DAILY
Qty: 1 KIT | Refills: 0 | Status: SHIPPED | OUTPATIENT
Start: 2023-01-05

## 2023-01-05 RX ORDER — SEMAGLUTIDE 1.34 MG/ML
1 INJECTION, SOLUTION SUBCUTANEOUS WEEKLY
Qty: 9 ML | Refills: 3 | Status: SHIPPED | OUTPATIENT
Start: 2023-01-05

## 2023-01-05 NOTE — ASSESSMENT & PLAN NOTE
Diabetes is unchanged. Continue current meds.  Continue current treatment regimen.  Diabetes will be reassessed in 3 months.    Making slow recovery from significant stroke.

## 2023-01-05 NOTE — PROGRESS NOTES
Office Note      Date: 2023  Patient Name: Alverto Syed  MRN: [id number]  : 1955    Chief Complaint   Patient presents with   • Diabetes       History of Present Illness:   Alverto Syed is a 66 y.o. male who presents for Diabetes type 2. Diagnosed in: . Treated in past with oral agents. Current treatments: ozempic, metformin, jardiance. Number of insulin shots per day: none. Checks blood sugar 288 times a day. Has low blood sugar: no. Aspirin use: Yes. Statin use: No - intolerant of statins. ACE-I/ARB use: No - no indication.. Changes in health since last visit: stroke with aphasia and hemiparesis. Last eye exam 2022.    His wife accompanies him and provides the history.  He was in hospital for about 2 months and then rehab for another 3 months after the stroke.      Subjective      Diabetic Complications:  Eyes: No  Kidneys: No  Feet: Yes - numbness and tingling  Heart: No    Diet and Exercise:  Meals per day: 3  Minutes of exercise per week: 0 mins.    Review of Systems:   Review of Systems   Constitutional: Negative.    Cardiovascular: Negative.    Gastrointestinal: Negative.    Endocrine: Negative.        The following portions of the patient's history were reviewed and updated as appropriate: allergies, current medications, past family history, past medical history, past social history, past surgical history and problem list.    Objective       Visit Vitals  /70   Pulse 105   Ht 193 cm (76\")   Wt 83.9 kg (185 lb)   SpO2 99%   BMI 22.52 kg/m²       Physical Exam:  Physical Exam  Constitutional:       Comments: In wheelchair, aphasic         Labs:    HbA1c  Lab Results   Component Value Date    HGBA1C 6.9 (H) 2022       CMP  Lab Results   Component Value Date    GLUCOSE 80 2021    BUN 20 2021    CREATININE 1.18 2021    EGFRIFNONA 62 2021    BCR 16.9 2021    K 4.8 2021    CO2 24.5 2021    CALCIUM 10.3 2021    AST 39 2021     ALT 26 12/28/2021        Lipid Panel  Lab Results   Component Value Date    HDL 39 (L) 12/28/2021     (H) 12/28/2021    TRIG 160 (H) 12/28/2021        TSH  Lab Results   Component Value Date    TSH 2.360 12/28/2021        Hemoglobin A1C  Lab Results   Component Value Date    HGBA1C 6.9 (H) 04/09/2022        Microalbumin/Creatinine  Lab Results   Component Value Date    MALBCRERATIO 49.0 12/28/2021    MICROALBUR 4.3 12/28/2021           Assessment / Plan      Assessment & Plan:  Diagnoses and all orders for this visit:    1. Uncontrolled type 2 diabetes mellitus with hyperglycemia (HCC) (Primary)  Assessment & Plan:  Diabetes is unchanged. Continue current meds.  Continue current treatment regimen.  Diabetes will be reassessed in 3 months.    Making slow recovery from significant stroke.    Orders:  -     POC Glucose, Blood    2. Type 1 diabetes mellitus with diabetic polyneuropathy (HCC)    3. Mixed hyperlipidemia  Assessment & Plan:  Resume ezetimibe, welchol and gemfibrozil.      Current Outpatient Medications   Medication Instructions   • aspirin 81 mg, Oral, Daily   • Benzalkonium Chloride 0.1 % liquid Topical   • colesevelam (WELCHOL) 625 MG tablet TAKE 3 TABLETS BY MOUTH TWICE DAILY WITH MEALS   • Continuous Blood Gluc Sensor (FreeStyle Travis 2 Sensor) misc USE 1 EVERY 14 DAYS   • DULoxetine (CYMBALTA) 60 mg, Oral, Daily   • ezetimibe (ZETIA) 10 mg, Oral, Daily   • gemfibrozil (LOPID) 600 MG tablet TAKE 1 TABLET BY MOUTH TWICE DAILY   • glucose blood (OneTouch Ultra) test strip Test bid DX: E11.65   • Jardiance 25 MG tablet tablet TAKE 1 TABLET BY MOUTH DAILY   • levETIRAcetam (KEPPRA) 500 mg, Oral   • metFORMIN (GLUCOPHAGE) 1000 MG tablet TAKE 1 TABLET BY MOUTH TWICE DAILY WITH MEALS   • Ozempic (1 MG/DOSE) 1 mg, Subcutaneous, Weekly   • sildenafil (VIAGRA) 100 mg, Oral, Daily PRN      Return in about 3 months (around 4/5/2023) for Recheck with A1c, CMP, lipid, TSH, microalbumin, foot  exam.    Yunior Hidalgo MD   01/05/2023

## 2023-04-10 ENCOUNTER — OFFICE VISIT (OUTPATIENT)
Dept: ENDOCRINOLOGY | Facility: CLINIC | Age: 68
End: 2023-04-10
Payer: MEDICARE

## 2023-04-10 VITALS
OXYGEN SATURATION: 100 % | WEIGHT: 180 LBS | DIASTOLIC BLOOD PRESSURE: 68 MMHG | SYSTOLIC BLOOD PRESSURE: 128 MMHG | HEART RATE: 89 BPM | BODY MASS INDEX: 21.92 KG/M2 | HEIGHT: 76 IN

## 2023-04-10 DIAGNOSIS — E11.65 UNCONTROLLED TYPE 2 DIABETES MELLITUS WITH HYPERGLYCEMIA: Primary | ICD-10-CM

## 2023-04-10 DIAGNOSIS — E10.42 TYPE 1 DIABETES MELLITUS WITH DIABETIC POLYNEUROPATHY: ICD-10-CM

## 2023-04-10 DIAGNOSIS — E78.2 MIXED HYPERLIPIDEMIA: ICD-10-CM

## 2023-04-10 LAB
ALBUMIN SERPL-MCNC: 4.3 G/DL (ref 3.5–5.2)
ALBUMIN/GLOB SERPL: 1.5 G/DL
ALP SERPL-CCNC: 84 U/L (ref 39–117)
ALT SERPL W P-5'-P-CCNC: 53 U/L (ref 1–41)
ANION GAP SERPL CALCULATED.3IONS-SCNC: 10.7 MMOL/L (ref 5–15)
AST SERPL-CCNC: 23 U/L (ref 1–40)
BILIRUB SERPL-MCNC: 0.3 MG/DL (ref 0–1.2)
BUN SERPL-MCNC: 24 MG/DL (ref 8–23)
BUN/CREAT SERPL: 19.7 (ref 7–25)
CALCIUM SPEC-SCNC: 10.3 MG/DL (ref 8.6–10.5)
CHLORIDE SERPL-SCNC: 100 MMOL/L (ref 98–107)
CHOLEST SERPL-MCNC: 283 MG/DL (ref 0–200)
CO2 SERPL-SCNC: 26.3 MMOL/L (ref 22–29)
CREAT SERPL-MCNC: 1.22 MG/DL (ref 0.76–1.27)
EGFRCR SERPLBLD CKD-EPI 2021: 65 ML/MIN/1.73
EXPIRATION DATE: NORMAL
EXPIRATION DATE: NORMAL
GLOBULIN UR ELPH-MCNC: 2.8 GM/DL
GLUCOSE BLDC GLUCOMTR-MCNC: 107 MG/DL (ref 70–130)
GLUCOSE SERPL-MCNC: 117 MG/DL (ref 65–99)
HBA1C MFR BLD: 7.3 %
HDLC SERPL-MCNC: 47 MG/DL (ref 40–60)
LDLC SERPL CALC-MCNC: 204 MG/DL (ref 0–100)
LDLC/HDLC SERPL: 4.3 {RATIO}
Lab: NORMAL
Lab: NORMAL
POTASSIUM SERPL-SCNC: 4.3 MMOL/L (ref 3.5–5.2)
PROT SERPL-MCNC: 7.1 G/DL (ref 6–8.5)
SODIUM SERPL-SCNC: 137 MMOL/L (ref 136–145)
TRIGL SERPL-MCNC: 170 MG/DL (ref 0–150)
TSH SERPL DL<=0.05 MIU/L-ACNC: 2.3 UIU/ML (ref 0.27–4.2)
VLDLC SERPL-MCNC: 32 MG/DL (ref 5–40)

## 2023-04-10 PROCEDURE — 36415 COLL VENOUS BLD VENIPUNCTURE: CPT | Performed by: INTERNAL MEDICINE

## 2023-04-10 PROCEDURE — 80061 LIPID PANEL: CPT | Performed by: INTERNAL MEDICINE

## 2023-04-10 PROCEDURE — 99214 OFFICE O/P EST MOD 30 MIN: CPT | Performed by: INTERNAL MEDICINE

## 2023-04-10 PROCEDURE — 82947 ASSAY GLUCOSE BLOOD QUANT: CPT | Performed by: INTERNAL MEDICINE

## 2023-04-10 PROCEDURE — 1159F MED LIST DOCD IN RCRD: CPT | Performed by: INTERNAL MEDICINE

## 2023-04-10 PROCEDURE — 80053 COMPREHEN METABOLIC PANEL: CPT | Performed by: INTERNAL MEDICINE

## 2023-04-10 PROCEDURE — 84443 ASSAY THYROID STIM HORMONE: CPT | Performed by: INTERNAL MEDICINE

## 2023-04-10 PROCEDURE — 1160F RVW MEDS BY RX/DR IN RCRD: CPT | Performed by: INTERNAL MEDICINE

## 2023-04-10 PROCEDURE — 83036 HEMOGLOBIN GLYCOSYLATED A1C: CPT | Performed by: INTERNAL MEDICINE

## 2023-04-10 PROCEDURE — 3051F HG A1C>EQUAL 7.0%<8.0%: CPT | Performed by: INTERNAL MEDICINE

## 2023-04-10 RX ORDER — BLOOD-GLUCOSE SENSOR
1 EACH MISCELLANEOUS
Qty: 2 EACH | Refills: 5 | Status: SHIPPED | OUTPATIENT
Start: 2023-04-10

## 2023-04-10 NOTE — PROGRESS NOTES
"     Office Note      Date: 04/10/2023  Patient Name: Alverto Syed  MRN: [id number]  : 1955    Chief Complaint   Patient presents with   • Diabetes     Type II       History of Present Illness:   Alverto Syed is a 67 y.o. male who presents for Diabetes type 2. Diagnosed in: . Treated in past with oral agents. Current treatments: ozempic, metformin, jardiance. Number of insulin shots per day: none. Checks blood sugar 288 times a day. Has low blood sugar: no. Aspirin use: Yes. Statin use: No - intolerant of statins. ACE-I/ARB use: No - no indication.. Changes in health since last visit: none. Last eye exam 2022.     His wife accompanies him and provides the history.        Subjective      Diabetic Complications:  Eyes: No  Kidneys: No  Feet: Yes - numbness and tingling  Heart: No    Diet and Exercise:  Meals per day: 3  Minutes of exercise per week: 0 mins.    Review of Systems:   Review of Systems   Constitutional: Negative.    Cardiovascular: Negative.    Gastrointestinal: Negative.    Endocrine: Negative.        The following portions of the patient's history were reviewed and updated as appropriate: allergies, current medications, past family history, past medical history, past social history, past surgical history and problem list.    Objective       Visit Vitals  /68 (BP Location: Left arm, Patient Position: Sitting, Cuff Size: Adult)   Pulse 89   Ht 193 cm (76\")   Wt 81.6 kg (180 lb)   SpO2 100%   BMI 21.91 kg/m²       Physical Exam:  Physical Exam  Constitutional:       Comments: Aphasic, in wheelchair   Neurological:      Mental Status: He is alert.         Labs:    HbA1c  Lab Results   Component Value Date    HGBA1C 7.3 04/10/2023       CMP  Lab Results   Component Value Date    GLUCOSE 80 2021    BUN 20 2021    CREATININE 1.18 2021    EGFRIFNONA 62 2021    BCR 16.9 2021    K 4.8 2021    CO2 24.5 2021    CALCIUM 10.3 2021    AST 39 " 12/28/2021    ALT 26 12/28/2021        Lipid Panel  Lab Results   Component Value Date    HDL 39 (L) 12/28/2021     (H) 12/28/2021    TRIG 160 (H) 12/28/2021        TSH  Lab Results   Component Value Date    TSH 2.360 12/28/2021        Hemoglobin A1C  Lab Results   Component Value Date    HGBA1C 7.3 04/10/2023        Microalbumin/Creatinine  Lab Results   Component Value Date    MALBCRERATIO 49.0 12/28/2021    MICROALBUR 4.3 12/28/2021           Assessment / Plan      Assessment & Plan:  Diagnoses and all orders for this visit:    1. Uncontrolled type 2 diabetes mellitus with hyperglycemia (Primary)  Assessment & Plan:  Diabetes is worsening.  A1c has crept up to 7.3%.  We discussed treatment options.  She will try to increase protein and decrease carbs in his diet.  Diabetes will be reassessed in 3 months.    Orders:  -     POC Glucose, Blood  -     POC Glycosylated Hemoglobin (Hb A1C)  -     Comprehensive Metabolic Panel; Future  -     Lipid Panel; Future  -     Microalbumin / Creatinine Urine Ratio - Urine, Clean Catch; Future  -     TSH; Future    2. Type 1 diabetes mellitus with diabetic polyneuropathy    3. Mixed hyperlipidemia  Assessment & Plan:  Statin intolerant.  Continue gemfibrozil.  He can't really swallow the large welchol pills.  We could try the powder if needed.  Check lipids today.      Other orders  -     Continuous Blood Gluc Sensor (FreeStyle Travis 3 Sensor) misc; 1 each Every 14 (Fourteen) Days.  Dispense: 2 each; Refill: 5    Current Outpatient Medications   Medication Instructions   • aspirin 81 mg, Oral, Daily   • Benzalkonium Chloride 0.1 % liquid Topical   • Blood Glucose Monitoring Suppl (OneTouch Verio) w/Device kit 1 each, Does not apply, Daily   • Continuous Blood Gluc Sensor (FreeStyle Travis 3 Sensor) misc 1 each, Does not apply, Every 14 Days   • DULoxetine (CYMBALTA) 60 mg, Oral, Daily   • empagliflozin (JARDIANCE) 25 mg, Oral, Daily   • gemfibrozil (LOPID) 600 mg, Oral, 2  Times Daily   • glucose blood (OneTouch Verio) test strip Use one daily; ICD-10 E11.65   • Lancets misc Use one daily   • levETIRAcetam (KEPPRA) 500 mg, Oral   • metFORMIN (GLUCOPHAGE) 1,000 mg, Oral, 2 Times Daily With Meals   • Ozempic (1 MG/DOSE) 1 mg, Subcutaneous, Weekly   • sildenafil (VIAGRA) 100 mg, Oral, Daily PRN      Return in about 3 months (around 7/10/2023) for Recheck with A1c.    Yunior Hidalgo MD   04/10/2023

## 2023-04-10 NOTE — ASSESSMENT & PLAN NOTE
Statin intolerant.  Continue gemfibrozil.  He can't really swallow the large welchol pills.  We could try the powder if needed.  Check lipids today.

## 2023-04-10 NOTE — ASSESSMENT & PLAN NOTE
Diabetes is worsening.  A1c has crept up to 7.3%.  We discussed treatment options.  She will try to increase protein and decrease carbs in his diet.  Diabetes will be reassessed in 3 months.

## 2023-04-11 RX ORDER — COLESEVELAM HYDROCHLORIDE 3.75 G/1
3.75 POWDER, FOR SUSPENSION ORAL DAILY
Qty: 30 EACH | Refills: 5 | Status: SHIPPED | OUTPATIENT
Start: 2023-04-11

## 2023-09-01 ENCOUNTER — OFFICE VISIT (OUTPATIENT)
Dept: ENDOCRINOLOGY | Facility: CLINIC | Age: 68
End: 2023-09-01
Payer: MEDICARE

## 2023-09-01 VITALS
BODY MASS INDEX: 21.31 KG/M2 | HEIGHT: 76 IN | SYSTOLIC BLOOD PRESSURE: 108 MMHG | HEART RATE: 43 BPM | DIASTOLIC BLOOD PRESSURE: 66 MMHG | WEIGHT: 175 LBS | OXYGEN SATURATION: 82 %

## 2023-09-01 DIAGNOSIS — E11.65 UNCONTROLLED TYPE 2 DIABETES MELLITUS WITH HYPERGLYCEMIA: Primary | ICD-10-CM

## 2023-09-01 DIAGNOSIS — E78.2 MIXED HYPERLIPIDEMIA: ICD-10-CM

## 2023-09-01 DIAGNOSIS — E11.42 TYPE 2 DIABETES MELLITUS WITH DIABETIC POLYNEUROPATHY, WITHOUT LONG-TERM CURRENT USE OF INSULIN: ICD-10-CM

## 2023-09-01 LAB
EXPIRATION DATE: ABNORMAL
EXPIRATION DATE: NORMAL
GLUCOSE BLDC GLUCOMTR-MCNC: 134 MG/DL (ref 70–130)
HBA1C MFR BLD: 7.2 %
Lab: ABNORMAL
Lab: NORMAL

## 2023-09-01 RX ORDER — COLESEVELAM HYDROCHLORIDE 3.75 G/1
3.75 POWDER, FOR SUSPENSION ORAL DAILY
Qty: 90 EACH | Refills: 3 | Status: SHIPPED | OUTPATIENT
Start: 2023-09-01

## 2023-09-01 RX ORDER — BLOOD SUGAR DIAGNOSTIC
STRIP MISCELLANEOUS
Qty: 100 EACH | Refills: 3 | Status: SHIPPED | OUTPATIENT
Start: 2023-09-01

## 2023-09-01 RX ORDER — BLOOD-GLUCOSE SENSOR
1 EACH MISCELLANEOUS
Qty: 2 EACH | Refills: 5 | Status: SHIPPED | OUTPATIENT
Start: 2023-09-01

## 2023-09-01 NOTE — ASSESSMENT & PLAN NOTE
Diabetes is unchanged. A1c acceptable at 7.2%.  Continue current treatment regimen.  Diabetes will be reassessed in 6 months.    FreeStyle Travis 3 CGM was downloaded today.  Data was reviewed from 8/19/23 to 9/1/23.  This showed consistent spike in glucose in midmorning but good control the rest of the day.

## 2023-09-01 NOTE — ASSESSMENT & PLAN NOTE
Resume gemfibrozil.   It was stopped during hospital stay.  We discussed using welchol powder instead of pills.

## 2023-09-01 NOTE — PROGRESS NOTES
"     Office Note      Date: 2023  Patient Name: Alverto Syed  MRN: [id number]  : 1955    Chief Complaint   Patient presents with    Diabetes     Uncontrolled type 2 diabetes mellitus with hyperglycemia         History of Present Illness:   Alverto Syed is a 67 y.o. male who presents for Diabetes type 2. Diagnosed in: . Treated in past with oral agents. Current treatments: ozempic, metformin, jardiance. Number of insulin shots per day: none. Checks blood sugar 288 times a day. Has low blood sugar: no. Aspirin use: Yes. Statin use: No - intolerant of statins. ACE-I/ARB use: No - no indication.. Changes in health since last visit: pancreatitis due to gallstones - had cholecystectomy. Last eye exam 2022.     His wife accompanies him and provides the history.     Subjective      Diabetic Complications:  Eyes: No  Kidneys: No  Feet: Yes - numbness and tingling  Heart: No    Diet and Exercise:  Meals per day: 3  Minutes of exercise per week: 0 mins.    Review of Systems:   Review of Systems   Constitutional: Negative.    Cardiovascular:  Positive for chest pain.   Gastrointestinal: Negative.    Endocrine: Negative.      The following portions of the patient's history were reviewed and updated as appropriate: allergies, current medications, past family history, past medical history, past social history, past surgical history, and problem list.    Objective     Visit Vitals  /66 (BP Location: Left arm, Patient Position: Sitting, Cuff Size: Adult)   Pulse (!) 43   Ht 193 cm (76\")   Wt 79.4 kg (175 lb)   SpO2 (!) 82%   BMI 21.30 kg/mý       Physical Exam:  Physical Exam  Constitutional:       Comments: In wheelchair; aphasic   Neurological:      Mental Status: He is alert.       Labs:    HbA1c  Lab Results   Component Value Date    HGBA1C 7.2 2023       CMP  Lab Results   Component Value Date    GLUCOSE 117 (H) 04/10/2023    BUN 24 (H) 04/10/2023    CREATININE 1.22 04/10/2023    EGFRIFNONA " 62 12/28/2021    BCR 19.7 04/10/2023    K 4.3 04/10/2023    CO2 26.3 04/10/2023    CALCIUM 10.3 04/10/2023    AST 23 04/10/2023    ALT 53 (H) 04/10/2023        Lipid Panel  Lab Results   Component Value Date    HDL 47 04/10/2023     (H) 04/10/2023    TRIG 170 (H) 04/10/2023        TSH  Lab Results   Component Value Date    TSH 2.300 04/10/2023        Hemoglobin A1C  Lab Results   Component Value Date    HGBA1C 7.2 09/01/2023        Microalbumin/Creatinine  Lab Results   Component Value Date    MALBCRERATIO 49.0 12/28/2021    MICROALBUR 4.3 12/28/2021           Assessment / Plan      Assessment & Plan:  Diagnoses and all orders for this visit:    1. Uncontrolled type 2 diabetes mellitus with hyperglycemia (Primary)  Assessment & Plan:  Diabetes is unchanged. A1c acceptable at 7.2%.  Continue current treatment regimen.  Diabetes will be reassessed in 6 months.    FreeStyle Travis 3 CGM was downloaded today.  Data was reviewed from 8/19/23 to 9/1/23.  This showed consistent spike in glucose in midmorning but good control the rest of the day.      Orders:  -     POC Glycosylated Hemoglobin (Hb A1C)  -     POC Glucose, Blood    2. Type 2 diabetes mellitus with diabetic polyneuropathy, without long-term current use of insulin    3. Mixed hyperlipidemia  Assessment & Plan:  Resume gemfibrozil.   It was stopped during hospital stay.  We discussed using welchol powder instead of pills.      Other orders  -     colesevelam (Welchol) 3.75 g pack pack; Take 1 packet by mouth Daily.  Dispense: 90 each; Refill: 3  -     glucose blood (OneTouch Verio) test strip; Use one daily; ICD-10 E11.65  Dispense: 100 each; Refill: 3  -     Continuous Blood Gluc Sensor (FreeStyle Travis 3 Sensor) misc; Use 1 each Every 14 (Fourteen) Days.  Dispense: 2 each; Refill: 5      Current Outpatient Medications   Medication Instructions    Benzalkonium Chloride 0.1 % liquid Topical    Blood Glucose Monitoring Suppl (OneTouch Verio) w/Device kit 1  each, Does not apply, Daily    colesevelam (WELCHOL) 3.75 g, Oral, Daily    Continuous Blood Gluc Sensor (FreeStyle Travis 3 Sensor) misc 1 each, Does not apply, Every 14 Days    DULoxetine (CYMBALTA) 60 mg, Oral, Daily    empagliflozin (JARDIANCE) 25 mg, Oral, Daily    gemfibrozil (LOPID) 600 mg, Oral, 2 Times Daily    glucose blood (OneTouch Verio) test strip Use one daily; ICD-10 E11.65    Lancets misc Use one daily    levETIRAcetam (KEPPRA) 500 mg, Oral    metFORMIN (GLUCOPHAGE) 1,000 mg, Oral, 2 Times Daily With Meals    Ozempic (1 MG/DOSE) 1 mg, Subcutaneous, Weekly      Return in about 6 months (around 3/1/2024) for Recheck with, A1c, CMP, lipid, TSH, microalbumin.    Yunior Hidalgo MD   09/01/2023

## 2024-01-04 ENCOUNTER — PRIOR AUTHORIZATION (OUTPATIENT)
Dept: ENDOCRINOLOGY | Facility: CLINIC | Age: 69
End: 2024-01-04
Payer: MEDICARE

## 2024-01-05 NOTE — TELEPHONE ENCOUNTER
Alverto Syed (Perez: [id number])  Rx #: [id number]  Ozempic (1 MG/DOSE) 4MG/3ML pen-injectors  Form  [employer] Electronic PA Form (2017 N[employer])  Created  2 days ago  Sent to Plan  24 hours ago  Plan Response  24 hours ago  Submit Clinical Questions  23 hours ago  Determination  Favorable  21 hours ago  Message from Plan  PA Case: [id number], Status: Approved, Coverage Starts on: 1/4/2024 12:00:00 AM, Coverage Ends on: 1/3/2025 12:00:00 AM

## 2024-02-05 NOTE — TELEPHONE ENCOUNTER
Rx Refill Note  Requested Prescriptions     Pending Prescriptions Disp Refills    metFORMIN (GLUCOPHAGE) 1000 MG tablet [Pharmacy Med Name: METFORMIN 1000MG TABLETS] 180 tablet 3     Sig: TAKE 1 TABLET BY MOUTH TWICE DAILY WITH MEALS      Last office visit with prescribing clinician: 9/1/2023     Next office visit with prescribing clinician: 2/4/2024       Madison Cruz MA  02/05/24, 15:38 EST

## 2024-02-06 RX ORDER — SEMAGLUTIDE 1.34 MG/ML
INJECTION, SOLUTION SUBCUTANEOUS
Qty: 9 ML | Refills: 3 | Status: SHIPPED | OUTPATIENT
Start: 2024-02-06

## 2024-02-06 NOTE — TELEPHONE ENCOUNTER
Rx Refill Note  Requested Prescriptions     Pending Prescriptions Disp Refills    Ozempic, 1 MG/DOSE, 4 MG/3ML solution pen-injector [Pharmacy Med Name: Ozempic (1 MG/DOSE) 4 MG/3ML Subcutaneous Solution Pen-injector] 3 mL 0     Sig: INJECT 1MG SUBCUTANEOUSLY ONCE A WEEK      Last office visit with prescribing clinician: 9/1/2023   Last telemedicine visit with prescribing clinician: Visit date not found   Next office visit with prescribing clinician: 3/8/2024                         Would you like a call back once the refill request has been completed: [] Yes [] No    If the office needs to give you a call back, can they leave a voicemail: [] Yes [] No    Parvez Vidales MA  02/06/24, 12:20 EST

## 2024-03-08 ENCOUNTER — OFFICE VISIT (OUTPATIENT)
Dept: ENDOCRINOLOGY | Facility: CLINIC | Age: 69
End: 2024-03-08
Payer: MEDICARE

## 2024-03-08 VITALS
HEART RATE: 55 BPM | DIASTOLIC BLOOD PRESSURE: 64 MMHG | OXYGEN SATURATION: 100 % | WEIGHT: 180 LBS | SYSTOLIC BLOOD PRESSURE: 110 MMHG | BODY MASS INDEX: 24.38 KG/M2 | HEIGHT: 72 IN

## 2024-03-08 DIAGNOSIS — E11.65 UNCONTROLLED TYPE 2 DIABETES MELLITUS WITH HYPERGLYCEMIA: Primary | ICD-10-CM

## 2024-03-08 DIAGNOSIS — E78.2 MIXED HYPERLIPIDEMIA: ICD-10-CM

## 2024-03-08 DIAGNOSIS — E11.42 TYPE 2 DIABETES MELLITUS WITH DIABETIC POLYNEUROPATHY, WITHOUT LONG-TERM CURRENT USE OF INSULIN: ICD-10-CM

## 2024-03-08 LAB
ALBUMIN SERPL-MCNC: 4.1 G/DL (ref 3.5–5.2)
ALBUMIN/GLOB SERPL: 1.5 G/DL
ALP SERPL-CCNC: 63 U/L (ref 39–117)
ALT SERPL W P-5'-P-CCNC: 15 U/L (ref 1–41)
ANION GAP SERPL CALCULATED.3IONS-SCNC: 9.6 MMOL/L (ref 5–15)
AST SERPL-CCNC: 16 U/L (ref 1–40)
BILIRUB SERPL-MCNC: 0.3 MG/DL (ref 0–1.2)
BUN SERPL-MCNC: 22 MG/DL (ref 8–23)
BUN/CREAT SERPL: 19.8 (ref 7–25)
CALCIUM SPEC-SCNC: 9.2 MG/DL (ref 8.6–10.5)
CHLORIDE SERPL-SCNC: 101 MMOL/L (ref 98–107)
CHOLEST SERPL-MCNC: 199 MG/DL (ref 0–200)
CO2 SERPL-SCNC: 28.4 MMOL/L (ref 22–29)
CREAT SERPL-MCNC: 1.11 MG/DL (ref 0.76–1.27)
EGFRCR SERPLBLD CKD-EPI 2021: 72.3 ML/MIN/1.73
EXPIRATION DATE: ABNORMAL
EXPIRATION DATE: ABNORMAL
GLOBULIN UR ELPH-MCNC: 2.7 GM/DL
GLUCOSE BLDC GLUCOMTR-MCNC: 149 MG/DL (ref 70–130)
GLUCOSE SERPL-MCNC: 177 MG/DL (ref 65–99)
HBA1C MFR BLD: 7.3 % (ref 4.5–5.7)
HDLC SERPL-MCNC: 49 MG/DL (ref 40–60)
LDLC SERPL CALC-MCNC: 126 MG/DL (ref 0–100)
LDLC/HDLC SERPL: 2.5 {RATIO}
Lab: ABNORMAL
Lab: ABNORMAL
POTASSIUM SERPL-SCNC: 4.1 MMOL/L (ref 3.5–5.2)
PROT SERPL-MCNC: 6.8 G/DL (ref 6–8.5)
SODIUM SERPL-SCNC: 139 MMOL/L (ref 136–145)
TRIGL SERPL-MCNC: 137 MG/DL (ref 0–150)
TSH SERPL DL<=0.05 MIU/L-ACNC: 2.53 UIU/ML (ref 0.27–4.2)
VLDLC SERPL-MCNC: 24 MG/DL (ref 5–40)

## 2024-03-08 PROCEDURE — 80053 COMPREHEN METABOLIC PANEL: CPT | Performed by: INTERNAL MEDICINE

## 2024-03-08 PROCEDURE — 84443 ASSAY THYROID STIM HORMONE: CPT | Performed by: INTERNAL MEDICINE

## 2024-03-08 PROCEDURE — 80061 LIPID PANEL: CPT | Performed by: INTERNAL MEDICINE

## 2024-03-08 RX ORDER — DULOXETIN HYDROCHLORIDE 30 MG/1
30 CAPSULE, DELAYED RELEASE ORAL DAILY
COMMUNITY
Start: 2024-02-26 | End: 2025-02-25

## 2024-03-08 RX ORDER — COLESEVELAM 180 1/1
1875 TABLET ORAL 2 TIMES DAILY WITH MEALS
Qty: 540 TABLET | Refills: 3 | Status: SHIPPED | OUTPATIENT
Start: 2024-03-08

## 2024-03-08 NOTE — ASSESSMENT & PLAN NOTE
Check lipids today.  He hasn't been taking welchol or gemfibrozil consistently.  His wife says he is swallowing pills better and would like change back from welchol powder.

## 2024-03-08 NOTE — PROGRESS NOTES
"     Office Note      Date: 2024  Patient Name: Alverto Syed  MRN: [id number]  : 1955    Chief Complaint   Patient presents with    Diabetes     Uncontrolled type 2 diabetes mellitus with hyperglycemia         History of Present Illness:   Alverto Syed is a 68 y.o. male who presents for Diabetes type 2. Diagnosed in: . Treated in past with oral agents. Current treatments: ozempic, metformin, jardiance. Number of insulin shots per day: none. Checks blood sugar 288 times a day. Has low blood sugar: no. Aspirin use: Yes. Statin use: No - intolerant of statins. ACE-I/ARB use: No. Changes in health since last visit: trial of baclofen pump - may have permanent one placed. Last eye exam 2022.     His wife accompanies him and provides the history.     Subjective      Diabetic Complications:  Eyes: No  Kidneys: No  Feet: Yes -    Heart: No    Diet and Exercise:  Meals per day: 3  Minutes of exercise per week: 0 mins.    Review of Systems:   Review of Systems   Constitutional: Negative.    Cardiovascular: Negative.    Gastrointestinal: Negative.    Endocrine: Negative.        The following portions of the patient's history were reviewed and updated as appropriate: allergies, current medications, past family history, past medical history, past social history, past surgical history, and problem list.    Objective     Visit Vitals  /64 (BP Location: Left arm, Patient Position: Sitting, Cuff Size: Adult)   Pulse 55   Ht 182.9 cm (72\")   Wt 81.6 kg (180 lb)   SpO2 100%   BMI 24.41 kg/m²       Physical Exam:  Physical Exam  Constitutional:       Comments: In wheelchair   Neurological:      Mental Status: He is alert.         Labs:    HbA1c  Lab Results   Component Value Date    HGBA1C 7.3 (A) 2024       CMP  Lab Results   Component Value Date    GLUCOSE 117 (H) 04/10/2023    BUN 24 (H) 04/10/2023    CREATININE 1.22 04/10/2023    EGFRIFNONA 62 2021    EGFRIFAFRI >60 2022    BCR 19.7 " 04/10/2023    K 4.3 04/10/2023    CO2 26.3 04/10/2023    CALCIUM 10.3 04/10/2023    LABIL2 1.0 (L) 04/11/2022    AST 23 04/10/2023    ALT 53 (H) 04/10/2023        Lipid Panel  Lab Results   Component Value Date    HDL 47 04/10/2023     (H) 04/10/2023    TRIG 170 (H) 04/10/2023        TSH  Lab Results   Component Value Date    TSH 2.300 04/10/2023        Hemoglobin A1C  Lab Results   Component Value Date    HGBA1C 7.3 (A) 03/08/2024        Microalbumin/Creatinine  Lab Results   Component Value Date    MALBCRERATIO 49.0 12/28/2021    MICROALBUR 4.3 12/28/2021           Assessment / Plan      Assessment & Plan:  Diagnoses and all orders for this visit:    1. Uncontrolled type 2 diabetes mellitus with hyperglycemia (Primary)  Assessment & Plan:  Diabetes is stable.   Adjust diet.  Diabetes will be reassessed in 6 months.    FreeStyle Travis 3 CGM was downloaded today.  Data was reviewed from 2/23/24 to 3/7/24.  This showed some postprandial spikes, but especially after BF.  He eats Cheerios (along with eggs and lundberg or sausage) most days.  Encouraged them to decrease carbs with bf.    Orders:  -     POC Glycosylated Hemoglobin (Hb A1C)  -     POC Glucose, Blood  -     Comprehensive Metabolic Panel; Future  -     Lipid Panel; Future  -     TSH; Future  -     Microalbumin / Creatinine Urine Ratio - Urine, Clean Catch; Future    2. Type 2 diabetes mellitus with diabetic polyneuropathy, without long-term current use of insulin    3. Mixed hyperlipidemia  Assessment & Plan:  Check lipids today.  He hasn't been taking welchol or gemfibrozil consistently.  His wife says he is swallowing pills better and would like change back from welchol powder.      Other orders  -     colesevelam (Welchol) 625 MG tablet; Take 3 tablets by mouth 2 (Two) Times a Day With Meals.  Dispense: 540 tablet; Refill: 3      Current Outpatient Medications   Medication Instructions    Benzalkonium Chloride 0.1 % liquid Topical    Blood Glucose  Monitoring Suppl (OneTouch Verio) w/Device kit 1 each, Does not apply, Daily    colesevelam (WELCHOL) 1,875 mg, Oral, 2 Times Daily With Meals    Continuous Blood Gluc Sensor (FreeStyle Travis 3 Sensor) misc 1 each, Does not apply, Every 14 Days    DULoxetine (CYMBALTA) 30 mg, Oral, Daily    empagliflozin (JARDIANCE) 25 mg, Oral, Daily    gemfibrozil (LOPID) 600 mg, Oral, 2 Times Daily    glucose blood (OneTouch Verio) test strip Use one daily; ICD-10 E11.65    Lancets misc Use one daily    levETIRAcetam (KEPPRA) 500 mg, Oral    metFORMIN (GLUCOPHAGE) 1,000 mg, Oral, 2 Times Daily With Meals    Semaglutide, 1 MG/DOSE, (Ozempic, 1 MG/DOSE,) 4 MG/3ML solution pen-injector INJECT 1MG SUBCUTANEOUSLY ONCE A WEEK      Return in about 6 months (around 9/8/2024) for Recheck with A1c.    Electronically signed by: Yunior Hidalgo MD  03/08/2024

## 2024-03-08 NOTE — ASSESSMENT & PLAN NOTE
Diabetes is stable.   Adjust diet.  Diabetes will be reassessed in 6 months.    FreeStyle Travis 3 CGM was downloaded today.  Data was reviewed from 2/23/24 to 3/7/24.  This showed some postprandial spikes, but especially after BF.  He eats Cheerios (along with eggs and lundberg or sausage) most days.  Encouraged them to decrease carbs with bf.

## 2024-04-01 RX ORDER — EMPAGLIFLOZIN 25 MG/1
25 TABLET, FILM COATED ORAL DAILY
Qty: 90 TABLET | Refills: 3 | Status: SHIPPED | OUTPATIENT
Start: 2024-04-01

## 2024-04-01 NOTE — TELEPHONE ENCOUNTER
Rx Refill Note  Requested Prescriptions     Pending Prescriptions Disp Refills    Jardiance 25 MG tablet tablet [Pharmacy Med Name: JARDIANCE 25MG TABLETS] 90 tablet 3     Sig: TAKE 1 TABLET BY MOUTH DAILY        Last office visit with prescribing clinician: 3/8/2024      Next office visit with prescribing clinician: 9/20/2024       Rosemary Hood (Jodi)  04/01/24, 12:50 EDT

## 2024-06-03 RX ORDER — BLOOD-GLUCOSE SENSOR
EACH MISCELLANEOUS
Qty: 2 EACH | Refills: 0 | Status: SHIPPED | OUTPATIENT
Start: 2024-06-03

## 2024-06-03 NOTE — TELEPHONE ENCOUNTER
Rx Refill Note  Requested Prescriptions     Pending Prescriptions Disp Refills    Continuous Glucose Sensor (FreeStyle Travis 3 Sensor) misc [Pharmacy Med Name: FREESTYLE TRAVIS 3 SENSOR KIT] 2 each 0     Sig: USE AS DIRECTED TO CHECK GLUCOSE **CHANGE  EVERY  14  DAYS**      Last office visit with prescribing clinician: 3/8/2024     Next office visit with prescribing clinician: 9/20/2024                           Aminah Chaudhari MA  06/03/24, 13:35 EDT

## 2024-09-20 ENCOUNTER — OFFICE VISIT (OUTPATIENT)
Dept: ENDOCRINOLOGY | Facility: CLINIC | Age: 69
End: 2024-09-20
Payer: MEDICARE

## 2024-09-20 VITALS
WEIGHT: 180 LBS | DIASTOLIC BLOOD PRESSURE: 64 MMHG | SYSTOLIC BLOOD PRESSURE: 122 MMHG | HEART RATE: 70 BPM | OXYGEN SATURATION: 99 % | HEIGHT: 72 IN | BODY MASS INDEX: 24.38 KG/M2

## 2024-09-20 DIAGNOSIS — E11.42 TYPE 2 DIABETES MELLITUS WITH DIABETIC POLYNEUROPATHY, WITHOUT LONG-TERM CURRENT USE OF INSULIN: ICD-10-CM

## 2024-09-20 DIAGNOSIS — E11.65 UNCONTROLLED TYPE 2 DIABETES MELLITUS WITH HYPERGLYCEMIA: Primary | ICD-10-CM

## 2024-09-20 DIAGNOSIS — E78.2 MIXED HYPERLIPIDEMIA: ICD-10-CM

## 2024-09-20 LAB
EXPIRATION DATE: ABNORMAL
EXPIRATION DATE: ABNORMAL
GLUCOSE BLDC GLUCOMTR-MCNC: 150 MG/DL (ref 70–130)
HBA1C MFR BLD: 6.9 % (ref 4.5–5.7)
Lab: ABNORMAL
Lab: ABNORMAL

## 2024-10-09 RX ORDER — BLOOD-GLUCOSE SENSOR
EACH MISCELLANEOUS
Qty: 2 EACH | Refills: 5 | Status: SHIPPED | OUTPATIENT
Start: 2024-10-09

## 2024-12-31 RX ORDER — SEMAGLUTIDE 1.34 MG/ML
1 INJECTION, SOLUTION SUBCUTANEOUS WEEKLY
Qty: 9 ML | Refills: 3 | Status: SHIPPED | OUTPATIENT
Start: 2024-12-31

## 2024-12-31 NOTE — TELEPHONE ENCOUNTER
Rx Refill Note  Requested Prescriptions     Pending Prescriptions Disp Refills    Semaglutide, 1 MG/DOSE, (Ozempic, 1 MG/DOSE,) 4 MG/3ML solution pen-injector [Pharmacy Med Name: Ozempic (1 MG/DOSE) 4 MG/3ML Subcutaneous Solution Pen-injector] 3 mL 0     Sig: INJECT 1 MG  SUBCUTANEOUSLY ONCE A WEEK      Last office visit with prescribing clinician: 9/20/2024   Last telemedicine visit with prescribing clinician: Visit date not found   Next office visit with prescribing clinician: 3/21/2025                         Would you like a call back once the refill request has been completed: [] Yes [] No    If the office needs to give you a call back, can they leave a voicemail: [] Yes [] No    Carmen Chavez MA  12/31/24, 09:19 EST

## 2025-03-21 ENCOUNTER — RESULTS FOLLOW-UP (OUTPATIENT)
Dept: ENDOCRINOLOGY | Facility: CLINIC | Age: 70
End: 2025-03-21

## 2025-03-21 ENCOUNTER — OFFICE VISIT (OUTPATIENT)
Dept: ENDOCRINOLOGY | Facility: CLINIC | Age: 70
End: 2025-03-21
Payer: MEDICARE

## 2025-03-21 VITALS — SYSTOLIC BLOOD PRESSURE: 110 MMHG | HEART RATE: 71 BPM | DIASTOLIC BLOOD PRESSURE: 68 MMHG | OXYGEN SATURATION: 95 %

## 2025-03-21 DIAGNOSIS — E78.2 MIXED HYPERLIPIDEMIA: ICD-10-CM

## 2025-03-21 DIAGNOSIS — E11.65 UNCONTROLLED TYPE 2 DIABETES MELLITUS WITH HYPERGLYCEMIA: Primary | ICD-10-CM

## 2025-03-21 DIAGNOSIS — E11.42 TYPE 2 DIABETES MELLITUS WITH DIABETIC POLYNEUROPATHY, WITHOUT LONG-TERM CURRENT USE OF INSULIN: ICD-10-CM

## 2025-03-21 LAB
ALBUMIN SERPL-MCNC: 4.3 G/DL (ref 3.5–5.2)
ALBUMIN/GLOB SERPL: 1.4 G/DL
ALP SERPL-CCNC: 77 U/L (ref 39–117)
ALT SERPL W P-5'-P-CCNC: 12 U/L (ref 1–41)
ANION GAP SERPL CALCULATED.3IONS-SCNC: 12.6 MMOL/L (ref 5–15)
AST SERPL-CCNC: 20 U/L (ref 1–40)
BILIRUB SERPL-MCNC: 0.3 MG/DL (ref 0–1.2)
BUN SERPL-MCNC: 22 MG/DL (ref 8–23)
BUN/CREAT SERPL: 19.1 (ref 7–25)
CALCIUM SPEC-SCNC: 9.5 MG/DL (ref 8.6–10.5)
CHLORIDE SERPL-SCNC: 102 MMOL/L (ref 98–107)
CHOLEST SERPL-MCNC: 258 MG/DL (ref 0–200)
CO2 SERPL-SCNC: 25.4 MMOL/L (ref 22–29)
CREAT SERPL-MCNC: 1.15 MG/DL (ref 0.76–1.27)
EGFRCR SERPLBLD CKD-EPI 2021: 68.9 ML/MIN/1.73
EXPIRATION DATE: ABNORMAL
EXPIRATION DATE: ABNORMAL
GLOBULIN UR ELPH-MCNC: 3.1 GM/DL
GLUCOSE BLDC GLUCOMTR-MCNC: 214 MG/DL (ref 70–130)
GLUCOSE SERPL-MCNC: 197 MG/DL (ref 65–99)
HBA1C MFR BLD: 8.1 % (ref 4.5–5.7)
HDLC SERPL-MCNC: 43 MG/DL (ref 40–60)
LDLC SERPL CALC-MCNC: 183 MG/DL (ref 0–100)
LDLC/HDLC SERPL: 4.2 {RATIO}
Lab: ABNORMAL
Lab: ABNORMAL
POTASSIUM SERPL-SCNC: 4.7 MMOL/L (ref 3.5–5.2)
PROT SERPL-MCNC: 7.4 G/DL (ref 6–8.5)
SODIUM SERPL-SCNC: 140 MMOL/L (ref 136–145)
TRIGL SERPL-MCNC: 173 MG/DL (ref 0–150)
TSH SERPL DL<=0.05 MIU/L-ACNC: 5.55 UIU/ML (ref 0.27–4.2)
VLDLC SERPL-MCNC: 32 MG/DL (ref 5–40)

## 2025-03-21 PROCEDURE — 80061 LIPID PANEL: CPT | Performed by: INTERNAL MEDICINE

## 2025-03-21 PROCEDURE — 84443 ASSAY THYROID STIM HORMONE: CPT | Performed by: INTERNAL MEDICINE

## 2025-03-21 PROCEDURE — 80053 COMPREHEN METABOLIC PANEL: CPT | Performed by: INTERNAL MEDICINE

## 2025-03-21 RX ORDER — SENNOSIDES A AND B 8.6 MG/1
8.6 TABLET, FILM COATED ORAL
COMMUNITY

## 2025-03-21 NOTE — ADDENDUM NOTE
Addended by: RENTAO MARTIN on: 3/21/2025 09:05 AM     Modules accepted: Orders    
My signature below certifies that the above stated patient is homebound and upon completion of the Face-To-Face encounter, has the need for intermittent skilled nursing, physical therapy and/or speech or occupational therapy services in their home for their current diagnosis as outlined in their initial plan of care. These services will continue to be monitored by myself or another physician.

## 2025-03-21 NOTE — PROGRESS NOTES
Office Note      Date: 2025  Patient Name: Alverto Syed  MRN: [id number]  : 1955    Chief Complaint   Patient presents with    Diabetes     Uncontrolled type 2 diabetes mellitus with hyperglycemia       History of Present Illness:   Alverto Syed is a 69 y.o. male who presents for Diabetes type 2. Diagnosed in: . Treated in past with oral agents. Current treatments: ozempic, metformin, jardiance. Number of insulin shots per day: none. Checks blood sugar 288 times a day. Has low blood sugar: no. Aspirin use: Yes. Statin use: No - intolerant of statins. ACE-I/ARB use: No. Changes in health since last visit: none. Last eye exam 2022.     His wife accompanies him and provides the history.    Subjective      Diabetic Complications:  Eyes: No  Kidneys: No  Feet: Yes -    Heart: No    Diet and Exercise:  Meals per day: 3  Minutes of exercise per week: 0 mins.    Review of Systems:   Review of Systems   Constitutional: Negative.    Cardiovascular: Negative.    Gastrointestinal: Negative.    Endocrine: Negative.        The following portions of the patient's history were reviewed and updated as appropriate: allergies, current medications, past family history, past medical history, past social history, past surgical history, and problem list.    Objective     Visit Vitals  /68   Pulse 71   SpO2 95%       Physical Exam:  Physical Exam  Constitutional:       Comments: In wheelchair   Cardiovascular:      Pulses:           Dorsalis pedis pulses are 1+ on the right side and 1+ on the left side.        Posterior tibial pulses are 1+ on the right side and 1+ on the left side.   Musculoskeletal:      Left foot: Deformity present.   Feet:      Right foot:      Protective Sensation: 5 sites tested.  0 sites sensed.      Skin integrity: Erythema present.      Toenail Condition: Right toenails are abnormally thick. Fungal disease present.     Left foot:      Protective Sensation: 5 sites tested.  0  "sites sensed.      Skin integrity: Erythema present.      Toenail Condition: Left toenails are abnormally thick. Fungal disease present.     Comments: Hammer toes on left  Neurological:      Mental Status: He is alert.         Labs:    HbA1c  Lab Results   Component Value Date    HGBA1C 8.1 (A) 03/21/2025       CMP  Lab Results   Component Value Date    GLUCOSE 197 (H) 03/21/2025    BUN 22 03/21/2025    CREATININE 1.15 03/21/2025    EGFRIFNONA 62 12/28/2021    EGFRIFAFRI >60 04/13/2022    BCR 19.1 03/21/2025    K 4.7 03/21/2025    CO2 25.4 03/21/2025    CALCIUM 9.5 03/21/2025    LABIL2 1.0 (L) 04/11/2022    AST 20 03/21/2025    ALT 12 03/21/2025        Lipid Panel  Lab Results   Component Value Date    HDL Cholesterol 43 03/21/2025    LDL Cholesterol  183 (H) 03/21/2025    LDL/HDL Ratio 4.20 03/21/2025    Triglycerides 173 (H) 03/21/2025        TSH  Lab Results   Component Value Date    TSH 5.550 (H) 03/21/2025        Hemoglobin A1C  No components found for: \"HGBA1C\"     Microalbumin/Creatinine  Lab Results   Component Value Date    MALBCRERATIO 49.0 12/28/2021    MICROALBUR 4.3 12/28/2021           Assessment / Plan      Assessment & Plan:  Diagnoses and all orders for this visit:    1. Uncontrolled type 2 diabetes mellitus with hyperglycemia (Primary)  Assessment & Plan:  Diabetes is worsening.  They haven't been as good with diet.  Continue current treatment regimen.  Work on improving diet.  Diabetes will be reassessed in 6 months.    FreeStyle Travis 3 CGM was downloaded today.  Data was reviewed from 3/8/25 to 3/21/25.  This showed fairly good glucose control with occ postprandial spike.  No patterns for medication adjustments.  Time in range was 88%.    Orders:  -     POC Glucose, Blood  -     POC Glycosylated Hemoglobin (Hb A1C)  -     Comprehensive Metabolic Panel; Future  -     Lipid Panel; Future  -     Microalbumin / Creatinine Urine Ratio - Urine, Clean Catch; Future  -     TSH; Future  -     " Comprehensive Metabolic Panel  -     Lipid Panel  -     Cancel: Microalbumin / Creatinine Urine Ratio - Urine, Clean Catch  -     TSH  -     Microalbumin / Creatinine Urine Ratio - Urine, Clean Catch    2. Mixed hyperlipidemia  Assessment & Plan:  Statin intolerant.  Not taking welchol and gemfibrozil.  Check lipids.      3. Type 2 diabetes mellitus with diabetic polyneuropathy, without long-term current use of insulin  Assessment & Plan:  Foot exam  performed today.        Current Outpatient Medications   Medication Instructions    baclofen (GABLOFEN) 20,000 mcg    Benzalkonium Chloride 0.1 % liquid Apply  topically to the appropriate area as directed.    Blood Glucose Monitoring Suppl (OneTouch Verio) w/Device kit 1 each, Not Applicable, Daily    colesevelam (WELCHOL) 1,875 mg, Oral, 2 Times Daily With Meals    Continuous Glucose Sensor (FreeStyle Travis 3 Sensor) misc USE AS DIRECTED TO CHECK GLUCOSE **CHANGE  EVERY  14  DAYS**    DULoxetine (CYMBALTA) 30 mg, Oral, Daily    gemfibrozil (LOPID) 600 mg, Oral, 2 Times Daily    glucose blood (OneTouch Verio) test strip Use one daily; ICD-10 E11.65    Jardiance 25 mg, Oral, Daily    Lancets misc Use one daily    levETIRAcetam (KEPPRA) 500 mg    metFORMIN (GLUCOPHAGE) 1,000 mg, Oral, 2 Times Daily With Meals    OnabotulinumtoxinA 400 Units    Ozempic (1 MG/DOSE) 1 mg, Subcutaneous, Weekly    senna (SENOKOT) 8.6 mg      Return in about 6 months (around 9/21/2025) for Recheck with A1c, TSH.    Electronically signed by: Yunior Hidalgo MD  03/21/2025

## 2025-03-21 NOTE — ASSESSMENT & PLAN NOTE
Diabetes is worsening.  They haven't been as good with diet.  Continue current treatment regimen.  Work on improving diet.  Diabetes will be reassessed in 6 months.    FreeStyle Travis 3 CGM was downloaded today.  Data was reviewed from 3/8/25 to 3/21/25.  This showed fairly good glucose control with occ postprandial spike.  No patterns for medication adjustments.  Time in range was 88%.

## 2025-04-07 RX ORDER — EMPAGLIFLOZIN 25 MG/1
25 TABLET, FILM COATED ORAL DAILY
Qty: 90 TABLET | Refills: 3 | Status: SHIPPED | OUTPATIENT
Start: 2025-04-07

## 2025-04-07 NOTE — TELEPHONE ENCOUNTER
Rx Refill Note  Requested Prescriptions     Pending Prescriptions Disp Refills    metFORMIN (GLUCOPHAGE) 1000 MG tablet [Pharmacy Med Name: metFORMIN HCl 1000 MG Oral Tablet] 180 tablet 1     Sig: TAKE 1 TABLET BY MOUTH TWICE DAILY WITH MEALS    empagliflozin (Jardiance) 25 MG tablet tablet [Pharmacy Med Name: Jardiance 25 MG Oral Tablet] 90 tablet 1     Sig: Take 1 tablet by mouth once daily      Last office visit with prescribing clinician: 3/21/2025   Last telemedicine visit with prescribing clinician: Visit date not found   Next office visit with prescribing clinician: 10/24/2025                         Would you like a call back once the refill request has been completed: [] Yes [] No    If the office needs to give you a call back, can they leave a voicemail: [] Yes [] No    Carmen Chavez MA  04/07/25, 12:41 EDT